# Patient Record
Sex: MALE | Race: WHITE | NOT HISPANIC OR LATINO | URBAN - METROPOLITAN AREA
[De-identification: names, ages, dates, MRNs, and addresses within clinical notes are randomized per-mention and may not be internally consistent; named-entity substitution may affect disease eponyms.]

---

## 2018-03-19 ENCOUNTER — INPATIENT (INPATIENT)
Facility: HOSPITAL | Age: 63
LOS: 2 days | Discharge: HOME HEALTH | End: 2018-03-22
Attending: HOSPITALIST | Admitting: HOSPITALIST
Payer: MEDICAID

## 2018-03-19 VITALS
SYSTOLIC BLOOD PRESSURE: 108 MMHG | RESPIRATION RATE: 18 BRPM | OXYGEN SATURATION: 100 % | DIASTOLIC BLOOD PRESSURE: 43 MMHG | HEART RATE: 87 BPM | TEMPERATURE: 97 F

## 2018-03-19 LAB
APPEARANCE UR: CLEAR — SIGNIFICANT CHANGE UP
APTT BLD: 30.7 SEC — SIGNIFICANT CHANGE UP (ref 27.5–37.4)
BASE EXCESS BLDV CALC-SCNC: 8 MMOL/L — SIGNIFICANT CHANGE UP
BASOPHILS # BLD AUTO: 0.02 K/UL — SIGNIFICANT CHANGE UP (ref 0–0.2)
BASOPHILS NFR BLD AUTO: 0.4 % — SIGNIFICANT CHANGE UP (ref 0–2)
BILIRUB UR-MCNC: NEGATIVE — SIGNIFICANT CHANGE UP
BLOOD GAS VENOUS - CREATININE: 1.12 MG/DL — SIGNIFICANT CHANGE UP (ref 0.5–1.3)
BLOOD UR QL VISUAL: NEGATIVE — SIGNIFICANT CHANGE UP
CHLORIDE BLDV-SCNC: 97 MMOL/L — SIGNIFICANT CHANGE UP (ref 96–108)
COLOR SPEC: YELLOW — SIGNIFICANT CHANGE UP
EOSINOPHIL # BLD AUTO: 0.11 K/UL — SIGNIFICANT CHANGE UP (ref 0–0.5)
EOSINOPHIL NFR BLD AUTO: 2.2 % — SIGNIFICANT CHANGE UP (ref 0–6)
GAS PNL BLDV: 129 MMOL/L — LOW (ref 136–146)
GLUCOSE BLDV-MCNC: 338 — HIGH (ref 70–99)
GLUCOSE UR-MCNC: >1000 — SIGNIFICANT CHANGE UP
HCO3 BLDV-SCNC: 30 MMOL/L — HIGH (ref 20–27)
HCT VFR BLD CALC: 40.4 % — SIGNIFICANT CHANGE UP (ref 39–50)
HCT VFR BLDV CALC: 39.5 % — SIGNIFICANT CHANGE UP (ref 39–51)
HGB BLD-MCNC: 12.8 G/DL — LOW (ref 13–17)
HGB BLDV-MCNC: 12.9 G/DL — LOW (ref 13–17)
IMM GRANULOCYTES # BLD AUTO: 0.03 # — SIGNIFICANT CHANGE UP
IMM GRANULOCYTES NFR BLD AUTO: 0.6 % — SIGNIFICANT CHANGE UP (ref 0–1.5)
INR BLD: 0.98 — SIGNIFICANT CHANGE UP (ref 0.88–1.17)
KETONES UR-MCNC: NEGATIVE — SIGNIFICANT CHANGE UP
LACTATE BLDV-MCNC: 1.4 MMOL/L — SIGNIFICANT CHANGE UP (ref 0.5–2)
LEUKOCYTE ESTERASE UR-ACNC: HIGH
LYMPHOCYTES # BLD AUTO: 1.16 K/UL — SIGNIFICANT CHANGE UP (ref 1–3.3)
LYMPHOCYTES # BLD AUTO: 23.2 % — SIGNIFICANT CHANGE UP (ref 13–44)
MCHC RBC-ENTMCNC: 28.5 PG — SIGNIFICANT CHANGE UP (ref 27–34)
MCHC RBC-ENTMCNC: 31.7 % — LOW (ref 32–36)
MCV RBC AUTO: 90 FL — SIGNIFICANT CHANGE UP (ref 80–100)
MONOCYTES # BLD AUTO: 0.99 K/UL — HIGH (ref 0–0.9)
MONOCYTES NFR BLD AUTO: 19.8 % — HIGH (ref 2–14)
NEUTROPHILS # BLD AUTO: 2.68 K/UL — SIGNIFICANT CHANGE UP (ref 1.8–7.4)
NEUTROPHILS NFR BLD AUTO: 53.8 % — SIGNIFICANT CHANGE UP (ref 43–77)
NITRITE UR-MCNC: NEGATIVE — SIGNIFICANT CHANGE UP
NRBC # FLD: 0 — SIGNIFICANT CHANGE UP
PCO2 BLDV: 61 MMHG — HIGH (ref 41–51)
PH BLDV: 7.36 PH — SIGNIFICANT CHANGE UP (ref 7.32–7.43)
PH UR: 6.5 — SIGNIFICANT CHANGE UP (ref 4.6–8)
PLATELET # BLD AUTO: 221 K/UL — SIGNIFICANT CHANGE UP (ref 150–400)
PMV BLD: 8.7 FL — SIGNIFICANT CHANGE UP (ref 7–13)
PO2 BLDV: 36 MMHG — SIGNIFICANT CHANGE UP (ref 35–40)
POTASSIUM BLDV-SCNC: 4.2 MMOL/L — SIGNIFICANT CHANGE UP (ref 3.4–4.5)
PROT UR-MCNC: 30 MG/DL — HIGH
PROTHROM AB SERPL-ACNC: 11.3 SEC — SIGNIFICANT CHANGE UP (ref 9.8–13.1)
RBC # BLD: 4.49 M/UL — SIGNIFICANT CHANGE UP (ref 4.2–5.8)
RBC # FLD: 14.6 % — HIGH (ref 10.3–14.5)
RBC CASTS # UR COMP ASSIST: SIGNIFICANT CHANGE UP (ref 0–?)
SAO2 % BLDV: 62.7 % — SIGNIFICANT CHANGE UP (ref 60–85)
SP GR SPEC: 1.02 — SIGNIFICANT CHANGE UP (ref 1–1.04)
SQUAMOUS # UR AUTO: SIGNIFICANT CHANGE UP
UROBILINOGEN FLD QL: NORMAL MG/DL — SIGNIFICANT CHANGE UP
WBC # BLD: 4.99 K/UL — SIGNIFICANT CHANGE UP (ref 3.8–10.5)
WBC # FLD AUTO: 4.99 K/UL — SIGNIFICANT CHANGE UP (ref 3.8–10.5)
WBC UR QL: HIGH (ref 0–?)

## 2018-03-19 NOTE — ED ADULT NURSE NOTE - OBJECTIVE STATEMENT
Pt received into room 20 C/O fevers, cough, weakness x 1week. Pt also C/O pain/swelling/redness to bilateral lower extremities. Pt A&Ox4, appears comfortable and in NAD; bilateral lower extremities appears red, swollen and are warm to touch.. Pt states he saw PCP 2 days ago for extremities: was diagnosed with an unknown lung infection and cellulitis to legs started on ABX. Awaiting Md evaluation, VS as noted. Pt has PMH COPD: on 4L NC continuously at home, AFIB, HTN, parkinson's, DM. 18 G IV placed to R AC, labs sent, cultures sent. Will continue to monitor for safety and comfort. Pt received into room 20 C/O fevers, cough, weakness x 1week. Pt also C/O pain/swelling/redness to bilateral lower extremities. Pt A&Ox4, appears comfortable and in NAD; bilateral lower extremities appears red, swollen and are warm to touch. Pt observed to have moisture associated dermatitis to lower abdomen, and non blanchable redness to sacral area. Pt states he saw PCP 2 days ago for extremities: was diagnosed with an unknown lung infection and cellulitis to legs started on ABX. Awaiting Md evaluation, VS as noted. Pt has PMH COPD: on 4L NC continuously at home, AFIB, HTN, parkinson's, DM. 18 G IV placed to R AC, labs sent, cultures sent. Will continue to monitor for safety and comfort.

## 2018-03-19 NOTE — ED ADULT NURSE NOTE - PMH
Asthma    Asthma with COPD    CAD (coronary artery disease)    COPD (chronic obstructive pulmonary disease)  on 4L NC  Depression    DM (diabetes mellitus), type 2    HTN (hypertension)    BRIAN on CPAP  on bipap now  PE (pulmonary embolism)  in 2001 (not on anticouagulants)

## 2018-03-19 NOTE — ED PROVIDER NOTE - ATTENDING CONTRIBUTION TO CARE
jhoana: 3 day hx of increasing redness to both legs. ran out of insulin 2 days ago. also has cough for several days; given tetracycline 2 days ago by PCP (allergy to pen and ampicillen)  exam: bilateral bright erythema to both legs; left leg has extension to thigh (left ).  impression: cellultiis  recc: check labs; antibiotics and fluids. cxr. jhoana: 3 day hx of increasing redness to both legs. ran out of insulin 2 days ago. also has cough for several days; given tetracycline 2 days ago by PCP (allergy to pen and ampicillen)  exam: bilateral bright erythema to both legs; left leg has extension to thigh (left ).  impression: cellultiis.  recc: check labs; antibiotics and fluids. cxr.

## 2018-03-19 NOTE — ED ADULT NURSE NOTE - ED STAT RN HANDOFF DETAILS
Patient is in A&Ox4, aware of plan of care and in NAD, and has room available.  Report given to nurse on floor via phone.  Patient awaiting transportation.  Will continue to monitor patient closely. HAVEN Lorenz R.N.

## 2018-03-19 NOTE — ED PROVIDER NOTE - OBJECTIVE STATEMENT
62M pmh dm, afib on AC, BRIAN p/w lower extremity cellulitis x1 week. rash appears bilateral with bullae, worse on right but with extension of 62M pmh dm, afib on AC, BRIAN p/w lower extremity cellulitis x1 week. rash appears bilateral with bullae, worse on right but with extension of erythema on the left upwards towards the thigh. pt also complains of dry cough x1 week which pt received tetracycline for. Cough gradual in onset associated with chills and fever. endorses some sob, denies n/v/d/cp.

## 2018-03-19 NOTE — ED ADULT TRIAGE NOTE - CHIEF COMPLAINT QUOTE
pt BIBA from home, pt c/o "I don't fel good x 3 days".  pt reports weakness.  denies chest pain and SOB.  pt says he felt better once he got outside

## 2018-03-20 DIAGNOSIS — I48.0 PAROXYSMAL ATRIAL FIBRILLATION: ICD-10-CM

## 2018-03-20 DIAGNOSIS — R21 RASH AND OTHER NONSPECIFIC SKIN ERUPTION: ICD-10-CM

## 2018-03-20 DIAGNOSIS — J44.9 CHRONIC OBSTRUCTIVE PULMONARY DISEASE, UNSPECIFIED: ICD-10-CM

## 2018-03-20 DIAGNOSIS — E11.621 TYPE 2 DIABETES MELLITUS WITH FOOT ULCER: ICD-10-CM

## 2018-03-20 DIAGNOSIS — L03.119 CELLULITIS OF UNSPECIFIED PART OF LIMB: ICD-10-CM

## 2018-03-20 DIAGNOSIS — F32.9 MAJOR DEPRESSIVE DISORDER, SINGLE EPISODE, UNSPECIFIED: ICD-10-CM

## 2018-03-20 DIAGNOSIS — G20 PARKINSON'S DISEASE: ICD-10-CM

## 2018-03-20 DIAGNOSIS — Z29.9 ENCOUNTER FOR PROPHYLACTIC MEASURES, UNSPECIFIED: ICD-10-CM

## 2018-03-20 DIAGNOSIS — G47.33 OBSTRUCTIVE SLEEP APNEA (ADULT) (PEDIATRIC): ICD-10-CM

## 2018-03-20 DIAGNOSIS — E66.01 MORBID (SEVERE) OBESITY DUE TO EXCESS CALORIES: ICD-10-CM

## 2018-03-20 DIAGNOSIS — L03.90 CELLULITIS, UNSPECIFIED: ICD-10-CM

## 2018-03-20 LAB
ALBUMIN SERPL ELPH-MCNC: 3.4 G/DL — SIGNIFICANT CHANGE UP (ref 3.3–5)
ALP SERPL-CCNC: 58 U/L — SIGNIFICANT CHANGE UP (ref 40–120)
ALT FLD-CCNC: 18 U/L — SIGNIFICANT CHANGE UP (ref 4–41)
ANISOCYTOSIS BLD QL: SLIGHT — SIGNIFICANT CHANGE UP
AST SERPL-CCNC: 27 U/L — SIGNIFICANT CHANGE UP (ref 4–40)
BASOPHILS NFR SPEC: 0.9 % — SIGNIFICANT CHANGE UP (ref 0–2)
BILIRUB SERPL-MCNC: 0.6 MG/DL — SIGNIFICANT CHANGE UP (ref 0.2–1.2)
BUN SERPL-MCNC: 13 MG/DL — SIGNIFICANT CHANGE UP (ref 7–23)
CALCIUM SERPL-MCNC: 8.3 MG/DL — LOW (ref 8.4–10.5)
CHLORIDE SERPL-SCNC: 96 MMOL/L — LOW (ref 98–107)
CK MB BLD-MCNC: 2.26 NG/ML — SIGNIFICANT CHANGE UP (ref 1–6.6)
CO2 SERPL-SCNC: 31 MMOL/L — SIGNIFICANT CHANGE UP (ref 22–31)
CREAT SERPL-MCNC: 1.22 MG/DL — SIGNIFICANT CHANGE UP (ref 0.5–1.3)
EOSINOPHIL NFR FLD: 2.7 % — SIGNIFICANT CHANGE UP (ref 0–6)
GIANT PLATELETS BLD QL SMEAR: PRESENT — SIGNIFICANT CHANGE UP
GLUCOSE SERPL-MCNC: 338 MG/DL — HIGH (ref 70–99)
LYMPHOCYTES NFR SPEC AUTO: 10.1 % — LOW (ref 13–44)
MONOCYTES NFR BLD: 9.2 % — HIGH (ref 2–9)
NEUTROPHIL AB SER-ACNC: 71.6 % — SIGNIFICANT CHANGE UP (ref 43–77)
NEUTS BAND # BLD: 1.8 % — SIGNIFICANT CHANGE UP (ref 0–6)
PLATELET COUNT - ESTIMATE: NORMAL — SIGNIFICANT CHANGE UP
POTASSIUM SERPL-MCNC: 4.7 MMOL/L — SIGNIFICANT CHANGE UP (ref 3.5–5.3)
POTASSIUM SERPL-SCNC: 4.7 MMOL/L — SIGNIFICANT CHANGE UP (ref 3.5–5.3)
PROT SERPL-MCNC: 6.7 G/DL — SIGNIFICANT CHANGE UP (ref 6–8.3)
SMUDGE CELLS # BLD: PRESENT — SIGNIFICANT CHANGE UP
SODIUM SERPL-SCNC: 137 MMOL/L — SIGNIFICANT CHANGE UP (ref 135–145)
SPECIMEN SOURCE: SIGNIFICANT CHANGE UP
SPECIMEN SOURCE: SIGNIFICANT CHANGE UP
TROPONIN T SERPL-MCNC: < 0.06 NG/ML — SIGNIFICANT CHANGE UP (ref 0–0.06)
VARIANT LYMPHS # BLD: 3.7 % — SIGNIFICANT CHANGE UP

## 2018-03-20 PROCEDURE — 99223 1ST HOSP IP/OBS HIGH 75: CPT | Mod: GC

## 2018-03-20 RX ORDER — LIDOCAINE 4 G/100G
1 CREAM TOPICAL DAILY
Qty: 0 | Refills: 0 | Status: DISCONTINUED | OUTPATIENT
Start: 2018-03-20 | End: 2018-03-22

## 2018-03-20 RX ORDER — INSULIN LISPRO 100/ML
10 VIAL (ML) SUBCUTANEOUS
Qty: 0 | Refills: 0 | Status: DISCONTINUED | OUTPATIENT
Start: 2018-03-20 | End: 2018-03-20

## 2018-03-20 RX ORDER — GLUCAGON INJECTION, SOLUTION 0.5 MG/.1ML
1 INJECTION, SOLUTION SUBCUTANEOUS ONCE
Qty: 0 | Refills: 0 | Status: DISCONTINUED | OUTPATIENT
Start: 2018-03-20 | End: 2018-03-20

## 2018-03-20 RX ORDER — GABAPENTIN 400 MG/1
600 CAPSULE ORAL
Qty: 0 | Refills: 0 | Status: DISCONTINUED | OUTPATIENT
Start: 2018-03-20 | End: 2018-03-20

## 2018-03-20 RX ORDER — METOPROLOL TARTRATE 50 MG
1 TABLET ORAL
Qty: 0 | Refills: 0 | COMMUNITY

## 2018-03-20 RX ORDER — CARBIDOPA AND LEVODOPA 25; 100 MG/1; MG/1
1 TABLET ORAL
Qty: 0 | Refills: 0 | COMMUNITY

## 2018-03-20 RX ORDER — GABAPENTIN 400 MG/1
600 CAPSULE ORAL THREE TIMES A DAY
Qty: 0 | Refills: 0 | Status: DISCONTINUED | OUTPATIENT
Start: 2018-03-20 | End: 2018-03-21

## 2018-03-20 RX ORDER — DEXTROSE 50 % IN WATER 50 %
25 SYRINGE (ML) INTRAVENOUS ONCE
Qty: 0 | Refills: 0 | Status: DISCONTINUED | OUTPATIENT
Start: 2018-03-20 | End: 2018-03-22

## 2018-03-20 RX ORDER — DEXTROSE 50 % IN WATER 50 %
25 SYRINGE (ML) INTRAVENOUS ONCE
Qty: 0 | Refills: 0 | Status: DISCONTINUED | OUTPATIENT
Start: 2018-03-20 | End: 2018-03-20

## 2018-03-20 RX ORDER — KETOROLAC TROMETHAMINE 30 MG/ML
15 SYRINGE (ML) INJECTION ONCE
Qty: 0 | Refills: 0 | Status: DISCONTINUED | OUTPATIENT
Start: 2018-03-20 | End: 2018-03-20

## 2018-03-20 RX ORDER — INSULIN LISPRO 100/ML
VIAL (ML) SUBCUTANEOUS AT BEDTIME
Qty: 0 | Refills: 0 | Status: DISCONTINUED | OUTPATIENT
Start: 2018-03-20 | End: 2018-03-22

## 2018-03-20 RX ORDER — INSULIN LISPRO 100/ML
VIAL (ML) SUBCUTANEOUS
Qty: 0 | Refills: 0 | Status: DISCONTINUED | OUTPATIENT
Start: 2018-03-20 | End: 2018-03-20

## 2018-03-20 RX ORDER — GABAPENTIN 400 MG/1
1 CAPSULE ORAL
Qty: 0 | Refills: 0 | COMMUNITY

## 2018-03-20 RX ORDER — DIVALPROEX SODIUM 500 MG/1
1 TABLET, DELAYED RELEASE ORAL
Qty: 0 | Refills: 0 | COMMUNITY

## 2018-03-20 RX ORDER — INSULIN LISPRO 100/ML
VIAL (ML) SUBCUTANEOUS AT BEDTIME
Qty: 0 | Refills: 0 | Status: DISCONTINUED | OUTPATIENT
Start: 2018-03-20 | End: 2018-03-20

## 2018-03-20 RX ORDER — RIVAROXABAN 15 MG-20MG
20 KIT ORAL EVERY 24 HOURS
Qty: 0 | Refills: 0 | Status: DISCONTINUED | OUTPATIENT
Start: 2018-03-20 | End: 2018-03-22

## 2018-03-20 RX ORDER — INSULIN LISPRO 100/ML
VIAL (ML) SUBCUTANEOUS
Qty: 0 | Refills: 0 | Status: DISCONTINUED | OUTPATIENT
Start: 2018-03-20 | End: 2018-03-22

## 2018-03-20 RX ORDER — FOLIC ACID 0.8 MG
1 TABLET ORAL
Qty: 0 | Refills: 0 | COMMUNITY

## 2018-03-20 RX ORDER — CARBIDOPA AND LEVODOPA 25; 100 MG/1; MG/1
1 TABLET ORAL THREE TIMES A DAY
Qty: 0 | Refills: 0 | Status: DISCONTINUED | OUTPATIENT
Start: 2018-03-20 | End: 2018-03-22

## 2018-03-20 RX ORDER — TIOTROPIUM BROMIDE 18 UG/1
1 CAPSULE ORAL; RESPIRATORY (INHALATION) DAILY
Qty: 0 | Refills: 0 | Status: DISCONTINUED | OUTPATIENT
Start: 2018-03-20 | End: 2018-03-22

## 2018-03-20 RX ORDER — DEXTROSE 50 % IN WATER 50 %
1 SYRINGE (ML) INTRAVENOUS ONCE
Qty: 0 | Refills: 0 | Status: DISCONTINUED | OUTPATIENT
Start: 2018-03-20 | End: 2018-03-22

## 2018-03-20 RX ORDER — CLONAZEPAM 1 MG
0.5 TABLET ORAL
Qty: 0 | Refills: 0 | Status: DISCONTINUED | OUTPATIENT
Start: 2018-03-20 | End: 2018-03-21

## 2018-03-20 RX ORDER — SODIUM CHLORIDE 9 MG/ML
1000 INJECTION, SOLUTION INTRAVENOUS
Qty: 0 | Refills: 0 | Status: DISCONTINUED | OUTPATIENT
Start: 2018-03-20 | End: 2018-03-22

## 2018-03-20 RX ORDER — RANITIDINE HYDROCHLORIDE 150 MG/1
1 TABLET, FILM COATED ORAL
Qty: 0 | Refills: 0 | COMMUNITY

## 2018-03-20 RX ORDER — METOPROLOL TARTRATE 50 MG
25 TABLET ORAL
Qty: 0 | Refills: 0 | Status: DISCONTINUED | OUTPATIENT
Start: 2018-03-20 | End: 2018-03-22

## 2018-03-20 RX ORDER — DIVALPROEX SODIUM 500 MG/1
500 TABLET, DELAYED RELEASE ORAL AT BEDTIME
Qty: 0 | Refills: 0 | Status: DISCONTINUED | OUTPATIENT
Start: 2018-03-20 | End: 2018-03-22

## 2018-03-20 RX ORDER — INSULIN GLARGINE 100 [IU]/ML
45 INJECTION, SOLUTION SUBCUTANEOUS ONCE
Qty: 0 | Refills: 0 | Status: COMPLETED | OUTPATIENT
Start: 2018-03-20 | End: 2018-03-20

## 2018-03-20 RX ORDER — NYSTATIN CREAM 100000 [USP'U]/G
1 CREAM TOPICAL
Qty: 0 | Refills: 0 | Status: DISCONTINUED | OUTPATIENT
Start: 2018-03-20 | End: 2018-03-22

## 2018-03-20 RX ORDER — INSULIN LISPRO 100/ML
10 VIAL (ML) SUBCUTANEOUS
Qty: 0 | Refills: 0 | Status: DISCONTINUED | OUTPATIENT
Start: 2018-03-20 | End: 2018-03-22

## 2018-03-20 RX ORDER — ACETAMINOPHEN 500 MG
650 TABLET ORAL EVERY 6 HOURS
Qty: 0 | Refills: 0 | Status: DISCONTINUED | OUTPATIENT
Start: 2018-03-20 | End: 2018-03-22

## 2018-03-20 RX ORDER — FOLIC ACID 0.8 MG
0.8 TABLET ORAL DAILY
Qty: 0 | Refills: 0 | Status: DISCONTINUED | OUTPATIENT
Start: 2018-03-20 | End: 2018-03-22

## 2018-03-20 RX ORDER — DEXTROSE 50 % IN WATER 50 %
12.5 SYRINGE (ML) INTRAVENOUS ONCE
Qty: 0 | Refills: 0 | Status: DISCONTINUED | OUTPATIENT
Start: 2018-03-20 | End: 2018-03-22

## 2018-03-20 RX ORDER — DEXTROSE 50 % IN WATER 50 %
1 SYRINGE (ML) INTRAVENOUS ONCE
Qty: 0 | Refills: 0 | Status: DISCONTINUED | OUTPATIENT
Start: 2018-03-20 | End: 2018-03-20

## 2018-03-20 RX ORDER — INSULIN GLARGINE 100 [IU]/ML
45 INJECTION, SOLUTION SUBCUTANEOUS AT BEDTIME
Qty: 0 | Refills: 0 | Status: DISCONTINUED | OUTPATIENT
Start: 2018-03-20 | End: 2018-03-21

## 2018-03-20 RX ORDER — FAMOTIDINE 10 MG/ML
20 INJECTION INTRAVENOUS AT BEDTIME
Qty: 0 | Refills: 0 | Status: DISCONTINUED | OUTPATIENT
Start: 2018-03-20 | End: 2018-03-22

## 2018-03-20 RX ORDER — LIPASE/PROTEASE/AMYLASE 16-48-48K
1 CAPSULE,DELAYED RELEASE (ENTERIC COATED) ORAL THREE TIMES A DAY
Qty: 0 | Refills: 0 | Status: DISCONTINUED | OUTPATIENT
Start: 2018-03-20 | End: 2018-03-20

## 2018-03-20 RX ORDER — TRAMADOL HYDROCHLORIDE 50 MG/1
25 TABLET ORAL EVERY 6 HOURS
Qty: 0 | Refills: 0 | Status: DISCONTINUED | OUTPATIENT
Start: 2018-03-20 | End: 2018-03-22

## 2018-03-20 RX ORDER — BUDESONIDE AND FORMOTEROL FUMARATE DIHYDRATE 160; 4.5 UG/1; UG/1
2 AEROSOL RESPIRATORY (INHALATION)
Qty: 0 | Refills: 0 | Status: DISCONTINUED | OUTPATIENT
Start: 2018-03-20 | End: 2018-03-22

## 2018-03-20 RX ORDER — DEXTROSE 50 % IN WATER 50 %
12.5 SYRINGE (ML) INTRAVENOUS ONCE
Qty: 0 | Refills: 0 | Status: DISCONTINUED | OUTPATIENT
Start: 2018-03-20 | End: 2018-03-20

## 2018-03-20 RX ORDER — INSULIN GLARGINE 100 [IU]/ML
45 INJECTION, SOLUTION SUBCUTANEOUS AT BEDTIME
Qty: 0 | Refills: 0 | Status: DISCONTINUED | OUTPATIENT
Start: 2018-03-20 | End: 2018-03-20

## 2018-03-20 RX ORDER — TIOTROPIUM BROMIDE 18 UG/1
2 CAPSULE ORAL; RESPIRATORY (INHALATION)
Qty: 0 | Refills: 0 | COMMUNITY

## 2018-03-20 RX ORDER — GLUCAGON INJECTION, SOLUTION 0.5 MG/.1ML
1 INJECTION, SOLUTION SUBCUTANEOUS ONCE
Qty: 0 | Refills: 0 | Status: DISCONTINUED | OUTPATIENT
Start: 2018-03-20 | End: 2018-03-22

## 2018-03-20 RX ORDER — LIPASE/PROTEASE/AMYLASE 16-48-48K
1 CAPSULE,DELAYED RELEASE (ENTERIC COATED) ORAL
Qty: 0 | Refills: 0 | COMMUNITY

## 2018-03-20 RX ORDER — SODIUM CHLORIDE 9 MG/ML
1000 INJECTION, SOLUTION INTRAVENOUS
Qty: 0 | Refills: 0 | Status: DISCONTINUED | OUTPATIENT
Start: 2018-03-20 | End: 2018-03-20

## 2018-03-20 RX ORDER — ENOXAPARIN SODIUM 100 MG/ML
40 INJECTION SUBCUTANEOUS DAILY
Qty: 0 | Refills: 0 | Status: DISCONTINUED | OUTPATIENT
Start: 2018-03-20 | End: 2018-03-20

## 2018-03-20 RX ORDER — RIVAROXABAN 15 MG-20MG
1 KIT ORAL
Qty: 0 | Refills: 0 | COMMUNITY

## 2018-03-20 RX ORDER — METOPROLOL TARTRATE 50 MG
25 TABLET ORAL
Qty: 0 | Refills: 0 | Status: DISCONTINUED | OUTPATIENT
Start: 2018-03-20 | End: 2018-03-20

## 2018-03-20 RX ADMIN — FAMOTIDINE 20 MILLIGRAM(S): 10 INJECTION INTRAVENOUS at 21:23

## 2018-03-20 RX ADMIN — DIVALPROEX SODIUM 500 MILLIGRAM(S): 500 TABLET, DELAYED RELEASE ORAL at 22:12

## 2018-03-20 RX ADMIN — TIOTROPIUM BROMIDE 1 CAPSULE(S): 18 CAPSULE ORAL; RESPIRATORY (INHALATION) at 11:24

## 2018-03-20 RX ADMIN — BUDESONIDE AND FORMOTEROL FUMARATE DIHYDRATE 2 PUFF(S): 160; 4.5 AEROSOL RESPIRATORY (INHALATION) at 21:23

## 2018-03-20 RX ADMIN — Medication 25 MILLIGRAM(S): at 17:43

## 2018-03-20 RX ADMIN — Medication 4: at 22:11

## 2018-03-20 RX ADMIN — Medication 100 MILLIGRAM(S): at 21:23

## 2018-03-20 RX ADMIN — RIVAROXABAN 20 MILLIGRAM(S): KIT at 17:44

## 2018-03-20 RX ADMIN — GABAPENTIN 600 MILLIGRAM(S): 400 CAPSULE ORAL at 13:53

## 2018-03-20 RX ADMIN — Medication 15 MILLIGRAM(S): at 17:44

## 2018-03-20 RX ADMIN — Medication 100 MILLIGRAM(S): at 00:09

## 2018-03-20 RX ADMIN — Medication 15 MILLIGRAM(S): at 02:45

## 2018-03-20 RX ADMIN — CARBIDOPA AND LEVODOPA 1 TABLET(S): 25; 100 TABLET ORAL at 13:53

## 2018-03-20 RX ADMIN — Medication 0.5 MILLIGRAM(S): at 21:23

## 2018-03-20 RX ADMIN — Medication 10 UNIT(S): at 13:55

## 2018-03-20 RX ADMIN — Medication 650 MILLIGRAM(S): at 11:48

## 2018-03-20 RX ADMIN — LIDOCAINE 1 PATCH: 4 CREAM TOPICAL at 13:56

## 2018-03-20 RX ADMIN — NYSTATIN CREAM 1 APPLICATION(S): 100000 CREAM TOPICAL at 17:43

## 2018-03-20 RX ADMIN — Medication 0.8 MILLIGRAM(S): at 13:53

## 2018-03-20 RX ADMIN — CARBIDOPA AND LEVODOPA 1 TABLET(S): 25; 100 TABLET ORAL at 21:23

## 2018-03-20 RX ADMIN — Medication 10 UNIT(S): at 17:58

## 2018-03-20 RX ADMIN — INSULIN GLARGINE 45 UNIT(S): 100 INJECTION, SOLUTION SUBCUTANEOUS at 02:45

## 2018-03-20 RX ADMIN — Medication 6: at 09:18

## 2018-03-20 RX ADMIN — Medication 650 MILLIGRAM(S): at 12:48

## 2018-03-20 RX ADMIN — Medication 2: at 13:54

## 2018-03-20 RX ADMIN — Medication 100 MILLIGRAM(S): at 13:56

## 2018-03-20 RX ADMIN — Medication 10 UNIT(S): at 09:18

## 2018-03-20 RX ADMIN — GABAPENTIN 600 MILLIGRAM(S): 400 CAPSULE ORAL at 21:23

## 2018-03-20 RX ADMIN — Medication 6: at 17:58

## 2018-03-20 RX ADMIN — INSULIN GLARGINE 45 UNIT(S): 100 INJECTION, SOLUTION SUBCUTANEOUS at 22:11

## 2018-03-20 NOTE — H&P ADULT - PROBLEM SELECTOR PLAN 8
Patient with history of anxiety and depression on buspiron and clonazepam.  -unclear use of divalproex, PCP unclear as Sunday was first visit  -will clarify with psychiatrist  -continue buspirone Patient with history of anxiety and depression on buspiron and clonazepam.  -unclear use of divalproex, PCP unclear as Sunday was first visit  -will clarify with psychiatrist  -continue buspirone  -will continue clonazepam ISTOP # [id number]

## 2018-03-20 NOTE — H&P ADULT - PROBLEM SELECTOR PLAN 4
Patient with elevated BG on high levels of insulin at home with likely peripheral neuropathy.  -continue gabapentin  -wound care of foot  -glargine 45U lispro 10U TID + SSI Patient with elevated BG on high levels of insulin at home with likely peripheral neuropathy.  -continue gabapentin, will increase to 600 TID given continued pain  -wound care of foot  -glargine 45U lispro 10U TID + SSI will adjust as needed

## 2018-03-20 NOTE — H&P ADULT - PROBLEM SELECTOR PLAN 2
Patient with abdominal rash with no signs of mucosal spread. Groin rash likely from dermatitis.   -nystatin powder  -will monitor for worsening symptoms

## 2018-03-20 NOTE — H&P ADULT - PROBLEM SELECTOR PLAN 3
Patient presenting with cough. No change in sputum production or worsening SOB. On 4 L NC at home. No leukocytosis. CXR no opacifications. Patient may have cardiac complications of BRIAN+COPD possibly pulm htn, however no LE edema.  -Continue on O2 at home as needed  -will collect TTE from OP provider, if none available with perform TTE given cardiomegaly and signs of venous stasis  -continue home inhaler tiotropium and budesonide-formoterol

## 2018-03-20 NOTE — H&P ADULT - NSHPLABSRESULTS_GEN_ALL_CORE
LABS:                        12.8   4.99  )-----------( 221      ( 19 Mar 2018 23:00 )             40.4         137  |  96<L>  |  13  ----------------------------<  338<H>  4.7   |  31  |  1.22    Ca    8.3<L>      19 Mar 2018 23:00    TPro  6.7  /  Alb  3.4  /  TBili  0.6  /  DBili  x   /  AST  27  /  ALT  18  /  AlkPhos  58      PT/INR - ( 19 Mar 2018 23:00 )   PT: 11.3 SEC;   INR: 0.98          PTT - ( 19 Mar 2018 23:00 )  PTT:30.7 SEC  CARDIAC MARKERS ( 19 Mar 2018 23:00 )  x     / < 0.06 ng/mL / x     / 2.26 ng/mL / x          Urinalysis Basic - ( 19 Mar 2018 23:00 )    Color: YELLOW / Appearance: CLEAR / S.023 / pH: 6.5  Gluc: >1000 / Ketone: NEGATIVE  / Bili: NEGATIVE / Urobili: NORMAL mg/dL   Blood: NEGATIVE / Protein: 30 mg/dL / Nitrite: NEGATIVE   Leuk Esterase: SMALL / RBC: 2-5 / WBC 5-10   Sq Epi: OCC / Non Sq Epi: x / Bacteria: x      EKG: vent rate 87 with afib Q waves in III, poor QRS progression, LVH    CXR: Lung clear w/ enlarged medistimum

## 2018-03-20 NOTE — H&P ADULT - PROBLEM SELECTOR PLAN 1
Patient without leukocytosis with mild fever in ED, and pain. Patient with signs of venous stasis and a diabetic foot ulcer. Unlikely DVT given bilateral erythema and pt on rivaroxaban. Was previously on doxycycline without improvement. Given 1 dose of clindamycin in ED.   -f/u blood cultures  -will continue clindamycin 900 mg q8

## 2018-03-20 NOTE — H&P ADULT - HISTORY OF PRESENT ILLNESS
62M pmh dm, afib on AC, BRIAN p/w lower extremity cellulitis x1 week. rash appears bilateral with bullae, worse on right but with extension of erythema on the left upwards towards the thigh. pt also complains of dry cough x1 week which pt received tetracycline for.  Vitals: 97.4; 87; 108/43; 100% on RA  Received 900mg IV clindamycin; ketorolac 15mg. 62M PMHx TIIDM, Afib on rivaroxaban s/p ablation, morbid obesity, Parkinson's disease, COPD on 4LNC, asthma, BRIAN on Bilevel uses inconsistently, HTN, TIIDM, presenting with cough via EMS and subsequently found to have possible cellulitis. Patient states that he went to his PCP for cough on 3/18 and was prescribed doxycycline for the cough. When asked if it was for his legs he said he didn't know. He states that he noticed the redness on his legs 1 week ago and there has been associated LE pain, without edema, fever ,chills. He has had cellulitis before and has a "diabetic" ulcer on his right foot, but denies any other trauma to the legs. He states that he does not have any "heart conditions". He has been taking his medications as prescribed.   When asked about the rash on his stomach he states he noticed it about 2-3 days ago. He  has not tried anything for it.   He states that he has a productive cough of white sputum and felt short of breath during coughing episodes. He takes his inhalers as prescribed. He has not had any sick contacts. He is on 4L at home for COPD.   Vitals: 97.4-100.4; 87; 108/43; 100% on RA  Received 900mg IV clindamycin; ketorolac 15mg.

## 2018-03-20 NOTE — H&P ADULT - ASSESSMENT
62M PMHx TIIDM, Afib on rivaroxaban s/p ablation, morbid obesity, Parkinson's disease, COPD on 4LNC, asthma, BRIAN on Bilevel uses inconsistently, HTN, DM,n presenting with cough via EMS and subsequently found to have possible cellulitis. Patient without leukocytosis with mild fever in ED, and pain. Patient with signs of venous stasis and a diabetic foot ulcer. Unlikely DVT given bilateral erythema and pt on rivaroxaban. Story not clear from patient will require further information from family and PCP.

## 2018-03-20 NOTE — H&P ADULT - PROBLEM SELECTOR PLAN 7
Patient states he does not use CPAP at home due to discomfort. Does not known settings.   -will clarify with OP and wife about settings  -will start if patient agreeable

## 2018-03-20 NOTE — H&P ADULT - ATTENDING COMMENTS
Patient seen and examined. Agree with above note by resident.    # non purulent cellulitis - C/w clinda for now. Demarcate the area. Follow up blood cultures.   # radiculopathy - PT eval. Supportive measures with lidoderm patch, tramadol and gabapentin.   #COPD - not in exacerbation. C/w spiriva, symbicort, o2 as needed   #paroxysmal afib - c/w xarelto   #parkinsons disease -  c/w sinemet    Plan discussed with patient

## 2018-03-20 NOTE — H&P ADULT - NSHPREVIEWOFSYSTEMS_GEN_ALL_CORE
CONSTITUTIONAL: No weakness, fevers or chills  EYES/ENT: No visual changes;  No vertigo or throat pain   NECK: No pain or stiffness  RESPIRATORY: No cough, wheezing, hemoptysis; No shortness of breath  CARDIOVASCULAR: No chest pain or palpitations  GASTROINTESTINAL: No abdominal or epigastric pain. No nausea, vomiting, or hematemesis; No diarrhea or constipation. No melena or hematochezia.  GENITOURINARY: No dysuria, frequency or hematuria  NEUROLOGICAL: No numbness or weakness  SKIN: No itching, rashes CONSTITUTIONAL: No weakness, fevers or chills  EYES/ENT: No visual changes;  No vertigo or throat pain   NECK: No pain or stiffness  RESPIRATORY: See HPI  CARDIOVASCULAR: No chest pain or palpitations  GASTROINTESTINAL:  No nausea, vomiting, or hematemesis; No diarrhea or constipation. No melena or hematochezia.  GENITOURINARY: No dysuria, frequency or hematuria  NEUROLOGICAL: Numbness of feet  SKIN: +itching/rash

## 2018-03-20 NOTE — H&P ADULT - NSHPSOCIALHISTORY_GEN_ALL_CORE
Patient lives at home with wife with nursing aid. He does not smoke, never did. Does not drink alcohol. Patient lives at home with wife with nursing aid. He does not smoke, never did. Does not drink alcohol. No other drugs

## 2018-03-20 NOTE — H&P ADULT - NSHPPHYSICALEXAM_GEN_ALL_CORE
Vital Signs Last 24 Hrs  T(C): 37.2 (20 Mar 2018 07:57), Max: 38 (19 Mar 2018 23:13)  T(F): 99 (20 Mar 2018 07:57), Max: 100.4 (19 Mar 2018 23:13)  HR: 74 (20 Mar 2018 07:57) (73 - 95)  BP: 121/68 (20 Mar 2018 07:57) (108/43 - 143/54)  BP(mean): --  RR: 20 (20 Mar 2018 07:57) (18 - 20)  SpO2: 97% (20 Mar 2018 07:57) (97% - 100%)  CAPILLARY BLOOD GLUCOSE      POCT Blood Glucose.: 268 mg/dL (20 Mar 2018 08:51)  POCT Blood Glucose.: 317 mg/dL (19 Mar 2018 21:34)    I&O's Summary    19 Mar 2018 07:01  -  20 Mar 2018 07:00  --------------------------------------------------------  IN: 0 mL / OUT: 400 mL / NET: -400 mL        PHYSICAL EXAM:  GENERAL: NAD, morbidly obese  HEAD:  poor dentition, Atraumatic, Normocephalic  EYES: EOMI, PERRLA, conjunctiva and sclera clear  NECK: Supple, No JVD, large neck circumference  CHEST/LUNG: Mild expiratory wheezes; 90% on RA; 18bpm; no supra/intrathoracic retractions.  HEART: Distant heart sounds. irregularly irregular; regular rhythm; No murmurs, rubs, or gallops  ABDOMEN: Obese abdomen; Soft, Nontender, Nondistended; Bowel sounds present  EXTREMITIES: Diffuse erythema of legs bilaterally with TTP; no hair on legs; ulceration on right lateral foot without purulent drainage; decreased Peripheral Pulses, No clubbing, cyanosis, crepitus or edema; mild cogwheel rigidity on right arm  PSYCH: AAOx3  NEUROLOGY: non-focal  SKIN: erythema of abdomen without TTP with skin breakdown Vital Signs Last 24 Hrs  T(C): 37.2 (20 Mar 2018 07:57), Max: 38 (19 Mar 2018 23:13)  T(F): 99 (20 Mar 2018 07:57), Max: 100.4 (19 Mar 2018 23:13)  HR: 74 (20 Mar 2018 07:57) (73 - 95)  BP: 121/68 (20 Mar 2018 07:57) (108/43 - 143/54)  BP(mean): --  RR: 20 (20 Mar 2018 07:57) (18 - 20)  SpO2: 97% (20 Mar 2018 07:57) (97% - 100%)  CAPILLARY BLOOD GLUCOSE      POCT Blood Glucose.: 268 mg/dL (20 Mar 2018 08:51)  POCT Blood Glucose.: 317 mg/dL (19 Mar 2018 21:34)    I&O's Summary    19 Mar 2018 07:01  -  20 Mar 2018 07:00  --------------------------------------------------------  IN: 0 mL / OUT: 400 mL / NET: -400 mL        PHYSICAL EXAM:  GENERAL: NAD, morbidly obese  HEAD:  poor dentition, Atraumatic, Normocephalic  EYES: EOMI, PERRLA, conjunctiva and sclera clear  NECK: Supple, No JVD, large neck circumference  CHEST/LUNG: Mild expiratory wheezes; 90% on RA; 18bpm; no supra/intrathoracic retractions.  HEART: Distant heart sounds. irregularly irregular; regular rhythm; No murmurs, rubs, or gallops  ABDOMEN: Obese abdomen; Soft, Nontender, Nondistended; Bowel sounds present  EXTREMITIES: Diffuse erythema of legs bilaterally with TTP; no hair on legs; ulceration on right lateral foot without purulent drainage; decreased Peripheral Pulses; decreased sensation of feet bilaterally No clubbing, cyanosis, crepitus or edema; mild cogwheel rigidity on right arm  PSYCH: AAOx3  NEUROLOGY: non-focal  SKIN: erythema of abdomen without TTP with skin breakdown

## 2018-03-20 NOTE — H&P ADULT - PMH
Asthma    Asthma with COPD    CAD (coronary artery disease)    COPD (chronic obstructive pulmonary disease)  on 4L NC  Depression    DM (diabetes mellitus), type 2    HTN (hypertension)    BRIAN on CPAP  on bipap now  Parkinson disease    PE (pulmonary embolism)  in 2001 (not on anticouagulants) Asthma    Asthma with COPD    CAD (coronary artery disease)    COPD (chronic obstructive pulmonary disease)  on 4L NC  Depression    DM (diabetes mellitus), type 2    HTN (hypertension)    BRIAN on CPAP  on bipap now  Parkinson disease    Paroxysmal atrial fibrillation    PE (pulmonary embolism)  in 2001 (not on anticouagulants)

## 2018-03-20 NOTE — PHYSICAL THERAPY INITIAL EVALUATION ADULT - ADDITIONAL COMMENTS
Pt. had HHA services, which were recently stopped.  Home O2 used 24/7.    Pt. left in bed post-PT in NAD with all lines/tubes intact & call bell within reach.  RN Donato mehta.

## 2018-03-20 NOTE — PHYSICAL THERAPY INITIAL EVALUATION ADULT - CRITERIA FOR SKILLED THERAPEUTIC INTERVENTIONS
rehab potential/therapy frequency/impairments found/predicted duration of therapy intervention/anticipated discharge recommendation/Inpatient restorative rehab

## 2018-03-20 NOTE — PHYSICAL THERAPY INITIAL EVALUATION ADULT - PERTINENT HX OF CURRENT PROBLEM, REHAB EVAL
Pt. is a 63 y/o male admitted to [facility] on 03/20/18 with a dx of cellulitis of legs and cough.  PT consult request in order to assess functional status.  H/O morbid obesity --> currently 308.6 pounds.

## 2018-03-21 ENCOUNTER — TRANSCRIPTION ENCOUNTER (OUTPATIENT)
Age: 63
End: 2018-03-21

## 2018-03-21 LAB
BACTERIA UR CULT: SIGNIFICANT CHANGE UP
BUN SERPL-MCNC: 15 MG/DL — SIGNIFICANT CHANGE UP (ref 7–23)
CALCIUM SERPL-MCNC: 7.9 MG/DL — LOW (ref 8.4–10.5)
CHLORIDE SERPL-SCNC: 96 MMOL/L — LOW (ref 98–107)
CO2 SERPL-SCNC: 30 MMOL/L — SIGNIFICANT CHANGE UP (ref 22–31)
CREAT SERPL-MCNC: 1.22 MG/DL — SIGNIFICANT CHANGE UP (ref 0.5–1.3)
GLUCOSE SERPL-MCNC: 283 MG/DL — HIGH (ref 70–99)
HBA1C BLD-MCNC: 11.1 % — HIGH (ref 4–5.6)
HCT VFR BLD CALC: 40.3 % — SIGNIFICANT CHANGE UP (ref 39–50)
HGB BLD-MCNC: 12.6 G/DL — LOW (ref 13–17)
MAGNESIUM SERPL-MCNC: 2.1 MG/DL — SIGNIFICANT CHANGE UP (ref 1.6–2.6)
MCHC RBC-ENTMCNC: 28.5 PG — SIGNIFICANT CHANGE UP (ref 27–34)
MCHC RBC-ENTMCNC: 31.3 % — LOW (ref 32–36)
MCV RBC AUTO: 91.2 FL — SIGNIFICANT CHANGE UP (ref 80–100)
NRBC # FLD: 0 — SIGNIFICANT CHANGE UP
PHOSPHATE SERPL-MCNC: 4 MG/DL — SIGNIFICANT CHANGE UP (ref 2.5–4.5)
PLATELET # BLD AUTO: 237 K/UL — SIGNIFICANT CHANGE UP (ref 150–400)
PMV BLD: 8.7 FL — SIGNIFICANT CHANGE UP (ref 7–13)
POTASSIUM SERPL-MCNC: 4.7 MMOL/L — SIGNIFICANT CHANGE UP (ref 3.5–5.3)
POTASSIUM SERPL-SCNC: 4.7 MMOL/L — SIGNIFICANT CHANGE UP (ref 3.5–5.3)
RBC # BLD: 4.42 M/UL — SIGNIFICANT CHANGE UP (ref 4.2–5.8)
RBC # FLD: 14.5 % — SIGNIFICANT CHANGE UP (ref 10.3–14.5)
SODIUM SERPL-SCNC: 136 MMOL/L — SIGNIFICANT CHANGE UP (ref 135–145)
SPECIMEN SOURCE: SIGNIFICANT CHANGE UP
WBC # BLD: 4.58 K/UL — SIGNIFICANT CHANGE UP (ref 3.8–10.5)
WBC # FLD AUTO: 4.58 K/UL — SIGNIFICANT CHANGE UP (ref 3.8–10.5)

## 2018-03-21 PROCEDURE — 99233 SBSQ HOSP IP/OBS HIGH 50: CPT | Mod: GC

## 2018-03-21 RX ORDER — INSULIN GLARGINE 100 [IU]/ML
37.5 INJECTION, SOLUTION SUBCUTANEOUS
Qty: 0 | Refills: 0 | Status: DISCONTINUED | OUTPATIENT
Start: 2018-03-21 | End: 2018-03-21

## 2018-03-21 RX ORDER — CLONAZEPAM 1 MG
0.5 TABLET ORAL DAILY
Qty: 0 | Refills: 0 | Status: DISCONTINUED | OUTPATIENT
Start: 2018-03-22 | End: 2018-03-22

## 2018-03-21 RX ORDER — DEXAMETHASONE 0.5 MG/5ML
10 ELIXIR ORAL EVERY 6 HOURS
Qty: 0 | Refills: 0 | Status: DISCONTINUED | OUTPATIENT
Start: 2018-03-21 | End: 2018-03-21

## 2018-03-21 RX ORDER — INSULIN GLARGINE 100 [IU]/ML
38 INJECTION, SOLUTION SUBCUTANEOUS
Qty: 0 | Refills: 0 | Status: DISCONTINUED | OUTPATIENT
Start: 2018-03-21 | End: 2018-03-22

## 2018-03-21 RX ORDER — FLUTICASONE PROPIONATE 50 MCG
1 SPRAY, SUSPENSION NASAL
Qty: 0 | Refills: 0 | Status: DISCONTINUED | OUTPATIENT
Start: 2018-03-21 | End: 2018-03-22

## 2018-03-21 RX ORDER — GABAPENTIN 400 MG/1
400 CAPSULE ORAL THREE TIMES A DAY
Qty: 0 | Refills: 0 | Status: DISCONTINUED | OUTPATIENT
Start: 2018-03-21 | End: 2018-03-22

## 2018-03-21 RX ORDER — SIMVASTATIN 20 MG/1
20 TABLET, FILM COATED ORAL AT BEDTIME
Qty: 0 | Refills: 0 | Status: DISCONTINUED | OUTPATIENT
Start: 2018-03-21 | End: 2018-03-22

## 2018-03-21 RX ORDER — INSULIN GLARGINE 100 [IU]/ML
35 INJECTION, SOLUTION SUBCUTANEOUS
Qty: 0 | Refills: 0 | Status: DISCONTINUED | OUTPATIENT
Start: 2018-03-21 | End: 2018-03-21

## 2018-03-21 RX ADMIN — Medication 10 UNIT(S): at 13:28

## 2018-03-21 RX ADMIN — Medication 1 SPRAY(S): at 18:47

## 2018-03-21 RX ADMIN — LIDOCAINE 1 PATCH: 4 CREAM TOPICAL at 01:00

## 2018-03-21 RX ADMIN — Medication 4: at 18:14

## 2018-03-21 RX ADMIN — BUDESONIDE AND FORMOTEROL FUMARATE DIHYDRATE 2 PUFF(S): 160; 4.5 AEROSOL RESPIRATORY (INHALATION) at 21:40

## 2018-03-21 RX ADMIN — Medication 0.8 MILLIGRAM(S): at 13:33

## 2018-03-21 RX ADMIN — Medication 25 MILLIGRAM(S): at 18:14

## 2018-03-21 RX ADMIN — LIDOCAINE 1 PATCH: 4 CREAM TOPICAL at 13:28

## 2018-03-21 RX ADMIN — CARBIDOPA AND LEVODOPA 1 TABLET(S): 25; 100 TABLET ORAL at 06:03

## 2018-03-21 RX ADMIN — RIVAROXABAN 20 MILLIGRAM(S): KIT at 18:13

## 2018-03-21 RX ADMIN — Medication 200 MILLIGRAM(S): at 18:11

## 2018-03-21 RX ADMIN — Medication 15 MILLIGRAM(S): at 06:01

## 2018-03-21 RX ADMIN — SIMVASTATIN 20 MILLIGRAM(S): 20 TABLET, FILM COATED ORAL at 21:45

## 2018-03-21 RX ADMIN — NYSTATIN CREAM 1 APPLICATION(S): 100000 CREAM TOPICAL at 18:15

## 2018-03-21 RX ADMIN — NYSTATIN CREAM 1 APPLICATION(S): 100000 CREAM TOPICAL at 06:03

## 2018-03-21 RX ADMIN — Medication 100 MILLIGRAM(S): at 06:01

## 2018-03-21 RX ADMIN — INSULIN GLARGINE 38 UNIT(S): 100 INJECTION, SOLUTION SUBCUTANEOUS at 22:36

## 2018-03-21 RX ADMIN — Medication 200 MILLIGRAM(S): at 14:14

## 2018-03-21 RX ADMIN — BUDESONIDE AND FORMOTEROL FUMARATE DIHYDRATE 2 PUFF(S): 160; 4.5 AEROSOL RESPIRATORY (INHALATION) at 09:58

## 2018-03-21 RX ADMIN — Medication 10 UNIT(S): at 09:59

## 2018-03-21 RX ADMIN — Medication 100 MILLIGRAM(S): at 21:44

## 2018-03-21 RX ADMIN — GABAPENTIN 600 MILLIGRAM(S): 400 CAPSULE ORAL at 06:02

## 2018-03-21 RX ADMIN — TIOTROPIUM BROMIDE 1 CAPSULE(S): 18 CAPSULE ORAL; RESPIRATORY (INHALATION) at 11:22

## 2018-03-21 RX ADMIN — CARBIDOPA AND LEVODOPA 1 TABLET(S): 25; 100 TABLET ORAL at 21:40

## 2018-03-21 RX ADMIN — Medication 4: at 13:28

## 2018-03-21 RX ADMIN — Medication 25 MILLIGRAM(S): at 06:02

## 2018-03-21 RX ADMIN — Medication 15 MILLIGRAM(S): at 18:15

## 2018-03-21 RX ADMIN — FAMOTIDINE 20 MILLIGRAM(S): 10 INJECTION INTRAVENOUS at 21:40

## 2018-03-21 RX ADMIN — Medication 10 UNIT(S): at 18:14

## 2018-03-21 RX ADMIN — INSULIN GLARGINE 35 UNIT(S): 100 INJECTION, SOLUTION SUBCUTANEOUS at 10:04

## 2018-03-21 RX ADMIN — Medication 100 MILLIGRAM(S): at 13:25

## 2018-03-21 RX ADMIN — CARBIDOPA AND LEVODOPA 1 TABLET(S): 25; 100 TABLET ORAL at 14:14

## 2018-03-21 RX ADMIN — Medication 4: at 09:59

## 2018-03-21 RX ADMIN — GABAPENTIN 400 MILLIGRAM(S): 400 CAPSULE ORAL at 14:17

## 2018-03-21 RX ADMIN — DIVALPROEX SODIUM 500 MILLIGRAM(S): 500 TABLET, DELAYED RELEASE ORAL at 21:40

## 2018-03-21 RX ADMIN — Medication 0.5 MILLIGRAM(S): at 06:25

## 2018-03-21 RX ADMIN — GABAPENTIN 400 MILLIGRAM(S): 400 CAPSULE ORAL at 21:44

## 2018-03-21 NOTE — DISCHARGE NOTE ADULT - SECONDARY DIAGNOSIS.
Asthma with COPD Type 2 diabetes mellitus with foot ulcer, with long-term current use of insulin Paroxysmal atrial fibrillation Parkinson disease BRIAN on CPAP Recurrent major depressive disorder, in partial remission

## 2018-03-21 NOTE — PROGRESS NOTE ADULT - ATTENDING COMMENTS
Patient seen and examined. Agree with above note by resident.    # non purulent cellulitis - C/w clinda for now. Demarcate the area. Follow up blood cultures. Appears to be improving today. Also component of venous stasis.   # radiculopathy - PT oleg recommends rehab. Will discuss with patient if he is amenable to going to rehab. Supportive measures with lidoderm patch, tramadol and gabapentin.   #COPD - not in exacerbation. C/w spiriva, symbicort, o2 as needed  #BRIAN - patient not compliant with cpap at home. Says he was drowsy yesterday. Will decrease gabapentin to 400TID. Patient did not receive any narcotics yesterday. Agreeable to use cpap at night now. Will monitor. If worsening encephalopathy, will check abg to r/o hypercapnia.   #paroxysmal afib - c/w xarelto   #parkinsons disease -  c/w sinemet    Plan discussed with patient.

## 2018-03-21 NOTE — DISCHARGE NOTE ADULT - PATIENT PORTAL LINK FT
You can access the [email or web address], offered by [facility], by registering with the following website: [email or web address]

## 2018-03-21 NOTE — DISCHARGE NOTE ADULT - CONDITIONS AT DISCHARGE
Mr qureshi is discharged , alert , oriented x 4, partial assistance for care and walking about with the Walker; Glucose is stable in the 200's;  both Lower extremities  are warn to touch and edema is 2+ .  He has MAD at the Abdominal Pannus and Groin are with Nystatin Powder.  The Left Foot Diabetic Ulcer gets Medihoney and coverage with Mepilex dressing.  Discharge Instructions are given to he and his wife.

## 2018-03-21 NOTE — DISCHARGE NOTE ADULT - CARE PLAN
Principal Discharge DX:	Cellulitis of lower extremity, unspecified laterality  Secondary Diagnosis:	Asthma with COPD  Secondary Diagnosis:	Type 2 diabetes mellitus with foot ulcer, with long-term current use of insulin  Secondary Diagnosis:	Paroxysmal atrial fibrillation  Secondary Diagnosis:	Parkinson disease  Secondary Diagnosis:	BRIAN on CPAP  Secondary Diagnosis:	Recurrent major depressive disorder, in partial remission Principal Discharge DX:	Cellulitis of lower extremity, unspecified laterality  Goal:	Treatment  Assessment and plan of treatment:	You were found to have cellulitis of the legs which improved with IV antibiotics. You will need to continue to take the clindamycin as directed orally three times a day. If your leg pain returns, you have fever or chills call you primary care physician or return to the ED. Please follow up with your primary care physician within  Secondary Diagnosis:	Asthma with COPD  Assessment and plan of treatment:	Be sure to take all asthma medications as prescribed, it is important that you are consistent and do not miss taking the ones that are meant to be taken daily, even if your breathing already feels well. If you have been prescribed steroids, also be sure to take those are prescribed. Be sure to follow up with your Primary Care Doctor or a Pulmonologist if you have one. If you do not already have an asthma action plan, please discuss establishing one with your outpatient Doctors.  Secondary Diagnosis:	Type 2 diabetes mellitus with foot ulcer, with long-term current use of insulin  Assessment and plan of treatment:	Type 2 diabetes is a disease that affects how your body uses glucose (sugar). Normally, when the blood sugar level increases, the pancreas makes more insulin. Insulin helps move sugar out of the blood so it can be used for energy. Type 2 diabetes develops because either the body cannot make enough insulin, or it cannot use the insulin correctly. After many years, your pancreas may stop making insulin.    Please see your PCP  to have your A1c checked every 3 months. You will need to return at least once each year to have your feet checked. You will need an eye exam once a year to check for retinopathy. You will also need urine tests every year to check for kidney problems. You may need tests to monitor for heart disease such as an EKG, stress test, blood pressure monitoring, and blood tests. Write down your questions so you remember to ask them during your visits.  Please monitor the wound on your foot as this may be a source of your recurrent infections. This is likely from poor circulation and poorly controlled diabetes.   Your insulin regimen was adjusted. Please take you insulin as directed.  Secondary Diagnosis:	Paroxysmal atrial fibrillation  Assessment and plan of treatment:	You were found to have an irregularly irregular rhythm called atrial fibrillation. You are on a medication for stroke prevention, called rivaroxaban. Continue medication for stroke prevention as advised. Please follow up with your cardiologist within 1 week of discharge.  Secondary Diagnosis:	Parkinson disease  Assessment and plan of treatment:	Please continue to take your medication as directed. Follow up with your outpatient neurologist.  Secondary Diagnosis:	BRIAN on CPAP  Assessment and plan of treatment:	Please follow up with your pulmonologist for management of obstructive sleep apnea with CPAP.  Secondary Diagnosis:	Recurrent major depressive disorder, in partial remission  Assessment and plan of treatment:	You have a history of depression, and should continue to take your home medications as prescribed. Please follow up in the outpatient setting with your primary care provider. Principal Discharge DX:	Cellulitis of lower extremity, unspecified laterality  Goal:	Treatment  Assessment and plan of treatment:	You were found to have cellulitis of the legs which improved with IV antibiotics. You will need to continue to take the clindamycin as directed orally three times a day. If your leg pain returns, you have fever or chills call you primary care physician or return to the ED. Please follow up with your primary care physician within  Secondary Diagnosis:	Asthma with COPD  Assessment and plan of treatment:	Be sure to take all asthma medications as prescribed, it is important that you are consistent and do not miss taking the ones that are meant to be taken daily, even if your breathing already feels well. If you have been prescribed steroids, also be sure to take those are prescribed. Be sure to follow up with your Primary Care Doctor or a Pulmonologist if you have one. If you do not already have an asthma action plan, please discuss establishing one with your outpatient Doctors.  Secondary Diagnosis:	Type 2 diabetes mellitus with foot ulcer, with long-term current use of insulin  Assessment and plan of treatment:	Type 2 diabetes is a disease that affects how your body uses glucose (sugar). Normally, when the blood sugar level increases, the pancreas makes more insulin. Insulin helps move sugar out of the blood so it can be used for energy. Type 2 diabetes develops because either the body cannot make enough insulin, or it cannot use the insulin correctly. After many years, your pancreas may stop making insulin.    Please see your PCP  to have your A1c checked every 3 months. You will need to return at least once each year to have your feet checked. You will need an eye exam once a year to check for retinopathy. You will also need urine tests every year to check for kidney problems. You may need tests to monitor for heart disease such as an EKG, stress test, blood pressure monitoring, and blood tests. Write down your questions so you remember to ask them during your visits.  Please monitor the wound on your foot as this may be a source of your recurrent infections. This is likely from poor circulation and poorly controlled diabetes.  Please call the podiatry to set up an initial appointment at [phone]  or whoever your primary care physician recommends.  Your insulin regimen was adjusted. Please take you insulin as directed.  Secondary Diagnosis:	Paroxysmal atrial fibrillation  Assessment and plan of treatment:	You were found to have an irregularly irregular rhythm called atrial fibrillation. You are on a medication for stroke prevention, called rivaroxaban. Continue medication for stroke prevention as advised. Please follow up with your cardiologist within 1 week of discharge.  Secondary Diagnosis:	Parkinson disease  Assessment and plan of treatment:	Please continue to take your medication as directed. Follow up with your outpatient neurologist.  Secondary Diagnosis:	BRIAN on CPAP  Assessment and plan of treatment:	Please follow up with your pulmonologist for management of obstructive sleep apnea with CPAP. With CPAP it is likely your energy will improve as well as your quality of sleep. It is an important intervention for your overall health.  Secondary Diagnosis:	Recurrent major depressive disorder, in partial remission  Assessment and plan of treatment:	You have a history of depression, and should continue to take your home medications as prescribed. Please follow up in the outpatient setting with your primary care provider.

## 2018-03-21 NOTE — PROGRESS NOTE ADULT - SUBJECTIVE AND OBJECTIVE BOX
Jonathan Gilbert  PGY 1  Care Model B  Pager [phone]    Patient is a 62y old  Male who presents with a chief complaint of cough and cellulitis of legs (20 Mar 2018 03:58)      SUBJECTIVE / OVERNIGHT EVENTS:    MEDICATIONS  (STANDING):  buDESOnide 160 MICROgram(s)/formoterol 4.5 MICROgram(s) Inhaler 2 Puff(s) Inhalation two times a day  busPIRone 15 milliGRAM(s) Oral two times a day  carbidopa/levodopa  25/100 1 Tablet(s) Oral three times a day  clindamycin IVPB 900 milliGRAM(s) IV Intermittent every 8 hours  clonazePAM Tablet 0.5 milliGRAM(s) Oral two times a day  dextrose 5%. 1000 milliLiter(s) (50 mL/Hr) IV Continuous <Continuous>  dextrose 50% Injectable 12.5 Gram(s) IV Push once  dextrose 50% Injectable 25 Gram(s) IV Push once  dextrose 50% Injectable 25 Gram(s) IV Push once  diVALproex  milliGRAM(s) Oral at bedtime  famotidine    Tablet 20 milliGRAM(s) Oral at bedtime  folic acid 0.8 milliGRAM(s) Oral daily  gabapentin 600 milliGRAM(s) Oral three times a day  insulin glargine Injectable (LANTUS) 45 Unit(s) SubCutaneous at bedtime  insulin lispro (HumaLOG) corrective regimen sliding scale   SubCutaneous three times a day before meals  insulin lispro (HumaLOG) corrective regimen sliding scale   SubCutaneous at bedtime  insulin lispro Injectable (HumaLOG) 10 Unit(s) SubCutaneous three times a day before meals  lidocaine   Patch 1 Patch Transdermal daily  metoprolol     tartrate 25 milliGRAM(s) Oral two times a day  nystatin Powder 1 Application(s) Topical two times a day  rivaroxaban 20 milliGRAM(s) Oral every 24 hours  tiotropium 18 MICROgram(s) Capsule 1 Capsule(s) Inhalation daily    MEDICATIONS  (PRN):  acetaminophen   Tablet. 650 milliGRAM(s) Oral every 6 hours PRN Moderate Pain (4 - 6)  dextrose Gel 1 Dose(s) Oral once PRN Blood Glucose LESS THAN 70 milliGRAM(s)/deciliter  glucagon  Injectable 1 milliGRAM(s) IntraMuscular once PRN Glucose LESS THAN 70 milligrams/deciliter  traMADol 25 milliGRAM(s) Oral every 6 hours PRN Severe Pain (7 - 10)      Vital Signs Last 24 Hrs  T(C): 37 (21 Mar 2018 05:25), Max: 37.2 (20 Mar 2018 07:57)  T(F): 98.6 (21 Mar 2018 05:25), Max: 99 (20 Mar 2018 07:57)  HR: 66 (21 Mar 2018 05:25) (66 - 78)  BP: 122/61 (21 Mar 2018 05:25) (121/68 - 148/81)  BP(mean): --  RR: 19 (21 Mar 2018 05:25) (19 - 20)  SpO2: 95% (21 Mar 2018 05:25) (95% - 98%)  CAPILLARY BLOOD GLUCOSE      POCT Blood Glucose.: 305 mg/dL (20 Mar 2018 21:54)  POCT Blood Glucose.: 270 mg/dL (20 Mar 2018 17:33)  POCT Blood Glucose.: 198 mg/dL (20 Mar 2018 12:33)  POCT Blood Glucose.: 268 mg/dL (20 Mar 2018 08:51)    I&O's Summary      PHYSICAL EXAM:  GENERAL: NAD, well-developed  HEAD:  Atraumatic, Normocephalic  EYES: EOMI, PERRLA, conjunctiva and sclera clear  NECK: Supple, No JVD  CHEST/LUNG: Clear to auscultation bilaterally; No wheeze  HEART: Regular rate and rhythm; No murmurs, rubs, or gallops  ABDOMEN: Soft, Nontender, Nondistended; Bowel sounds present  EXTREMITIES:  2+ Peripheral Pulses, No clubbing, cyanosis, or edema  PSYCH: AAOx3  NEUROLOGY: non-focal  SKIN: No rashes or lesions    LABS:                        12.6   4.58  )-----------( 237      ( 21 Mar 2018 06:20 )             40.3     03-19    137  |  96<L>  |  13  ----------------------------<  338<H>  4.7   |  31  |  1.22    Ca    8.3<L>      19 Mar 2018 23:00    TPro  6.7  /  Alb  3.4  /  TBili  0.6  /  DBili  x   /  AST  27  /  ALT  18  /  AlkPhos  58  03-19    PT/INR - ( 19 Mar 2018 23:00 )   PT: 11.3 SEC;   INR: 0.98          PTT - ( 19 Mar 2018 23:00 )  PTT:30.7 SEC  CARDIAC MARKERS ( 19 Mar 2018 23:00 )  x     / < 0.06 ng/mL / x     / 2.26 ng/mL / x          Urinalysis Basic - ( 19 Mar 2018 23:00 )    Color: YELLOW / Appearance: CLEAR / S.023 / pH: 6.5  Gluc: >1000 / Ketone: NEGATIVE  / Bili: NEGATIVE / Urobili: NORMAL mg/dL   Blood: NEGATIVE / Protein: 30 mg/dL / Nitrite: NEGATIVE   Leuk Esterase: SMALL / RBC: 2-5 / WBC 5-10   Sq Epi: OCC / Non Sq Epi: x / Bacteria: x        RADIOLOGY & ADDITIONAL TESTS:    Imaging Personally Reviewed:    Consultant(s) Notes Reviewed:      Care Discussed with Consultants/Other Providers: Jonathan Gilbert  PGY 1  Care Model B  Pager [phone]    Patient is a 62y old  Male who presents with a chief complaint of cough and cellulitis of legs (20 Mar 2018 03:58)      SUBJECTIVE / OVERNIGHT EVENTS:  Patient states that he is feeling well with no complaints. The bilateral leg pain has improved. He does not know if the redness has improved.   He states that he does not want to use CPAP despite risks until he has another sleep study.   He only continues to have a mild cough without sputum production.   He has no associated fever, chills, chest pain, palpitations, N/V, diarrhea.    MEDICATIONS  (STANDING):  buDESOnide 160 MICROgram(s)/formoterol 4.5 MICROgram(s) Inhaler 2 Puff(s) Inhalation two times a day  busPIRone 15 milliGRAM(s) Oral two times a day  carbidopa/levodopa  25/100 1 Tablet(s) Oral three times a day  clindamycin IVPB 900 milliGRAM(s) IV Intermittent every 8 hours  clonazePAM Tablet 0.5 milliGRAM(s) Oral two times a day  dextrose 5%. 1000 milliLiter(s) (50 mL/Hr) IV Continuous <Continuous>  dextrose 50% Injectable 12.5 Gram(s) IV Push once  dextrose 50% Injectable 25 Gram(s) IV Push once  dextrose 50% Injectable 25 Gram(s) IV Push once  diVALproex  milliGRAM(s) Oral at bedtime  famotidine    Tablet 20 milliGRAM(s) Oral at bedtime  folic acid 0.8 milliGRAM(s) Oral daily  gabapentin 600 milliGRAM(s) Oral three times a day  insulin glargine Injectable (LANTUS) 45 Unit(s) SubCutaneous at bedtime  insulin lispro (HumaLOG) corrective regimen sliding scale   SubCutaneous three times a day before meals  insulin lispro (HumaLOG) corrective regimen sliding scale   SubCutaneous at bedtime  insulin lispro Injectable (HumaLOG) 10 Unit(s) SubCutaneous three times a day before meals  lidocaine   Patch 1 Patch Transdermal daily  metoprolol     tartrate 25 milliGRAM(s) Oral two times a day  nystatin Powder 1 Application(s) Topical two times a day  rivaroxaban 20 milliGRAM(s) Oral every 24 hours  tiotropium 18 MICROgram(s) Capsule 1 Capsule(s) Inhalation daily    MEDICATIONS  (PRN):  acetaminophen   Tablet. 650 milliGRAM(s) Oral every 6 hours PRN Moderate Pain (4 - 6)  dextrose Gel 1 Dose(s) Oral once PRN Blood Glucose LESS THAN 70 milliGRAM(s)/deciliter  glucagon  Injectable 1 milliGRAM(s) IntraMuscular once PRN Glucose LESS THAN 70 milligrams/deciliter  traMADol 25 milliGRAM(s) Oral every 6 hours PRN Severe Pain (7 - 10)      Vital Signs Last 24 Hrs  T(C): 37 (21 Mar 2018 05:25), Max: 37.2 (20 Mar 2018 07:57)  T(F): 98.6 (21 Mar 2018 05:25), Max: 99 (20 Mar 2018 07:57)  HR: 66 (21 Mar 2018 05:25) (66 - 78)  BP: 122/61 (21 Mar 2018 05:25) (121/68 - 148/81)  BP(mean): --  RR: 19 (21 Mar 2018 05:25) (19 - 20)  SpO2: 95% (21 Mar 2018 05:25) (95% - 98%)  CAPILLARY BLOOD GLUCOSE      POCT Blood Glucose.: 305 mg/dL (20 Mar 2018 21:54)  POCT Blood Glucose.: 270 mg/dL (20 Mar 2018 17:33)  POCT Blood Glucose.: 198 mg/dL (20 Mar 2018 12:33)  POCT Blood Glucose.: 268 mg/dL (20 Mar 2018 08:51)    I&O's Summary      PHYSICAL EXAM:  GENERAL: NAD, morbidly obese  	HEAD:  poor dentition, Atraumatic, Normocephalic  	EYES: EOMI, PERRLA, conjunctiva and sclera clear  	NECK: Supple, No JVD, large neck circumference  	CHEST/LUNG: Mild expiratory wheezes; 92% on RA; 18bpm; no supra/intrathoracic retractions.  	HEART: Distant heart sounds. Irregularly irregular; regular rhythm; No murmurs, rubs, or gallops  	ABDOMEN: Obese abdomen; Soft, Nontender, Nondistended; Bowel sounds present  	EXTREMITIES: Diffuse erythema of legs bilaterally with improved TTP extending to the knees; no hair on legs; ulceration on left lateral foot without purulent drainage; decreased Peripheral Pulses; decreased sensation of feet bilaterally No clubbing, cyanosis, crepitus or edema; mild cogwheel rigidity on right arm  	PSYCH: AAOx3  	NEUROLOGY: non-focal  SKIN: erythema of abdomen without TTP with skin breakdown    LABS:                        12.6   4.58  )-----------( 237      ( 21 Mar 2018 06:20 )             40.3         137  |  96<L>  |  13  ----------------------------<  338<H>  4.7   |  31  |  1.22    Ca    8.3<L>      19 Mar 2018 23:00    TPro  6.7  /  Alb  3.4  /  TBili  0.6  /  DBili  x   /  AST  27  /  ALT  18  /  AlkPhos  58  -    PT/INR - ( 19 Mar 2018 23:00 )   PT: 11.3 SEC;   INR: 0.98          PTT - ( 19 Mar 2018 23:00 )  PTT:30.7 SEC  CARDIAC MARKERS ( 19 Mar 2018 23:00 )  x     / < 0.06 ng/mL / x     / 2.26 ng/mL / x          Urinalysis Basic - ( 19 Mar 2018 23:00 )    Color: YELLOW / Appearance: CLEAR / S.023 / pH: 6.5  Gluc: >1000 / Ketone: NEGATIVE  / Bili: NEGATIVE / Urobili: NORMAL mg/dL   Blood: NEGATIVE / Protein: 30 mg/dL / Nitrite: NEGATIVE   Leuk Esterase: SMALL / RBC: 2-5 / WBC 5-10   Sq Epi: OCC / Non Sq Epi: x / Bacteria: x

## 2018-03-21 NOTE — DISCHARGE NOTE ADULT - PLAN OF CARE
Treatment You were found to have cellulitis of the legs which improved with IV antibiotics. You will need to continue to take the clindamycin as directed orally three times a day. If your leg pain returns, you have fever or chills call you primary care physician or return to the ED. Please follow up with your primary care physician within Be sure to take all asthma medications as prescribed, it is important that you are consistent and do not miss taking the ones that are meant to be taken daily, even if your breathing already feels well. If you have been prescribed steroids, also be sure to take those are prescribed. Be sure to follow up with your Primary Care Doctor or a Pulmonologist if you have one. If you do not already have an asthma action plan, please discuss establishing one with your outpatient Doctors. Type 2 diabetes is a disease that affects how your body uses glucose (sugar). Normally, when the blood sugar level increases, the pancreas makes more insulin. Insulin helps move sugar out of the blood so it can be used for energy. Type 2 diabetes develops because either the body cannot make enough insulin, or it cannot use the insulin correctly. After many years, your pancreas may stop making insulin.    Please see your PCP  to have your A1c checked every 3 months. You will need to return at least once each year to have your feet checked. You will need an eye exam once a year to check for retinopathy. You will also need urine tests every year to check for kidney problems. You may need tests to monitor for heart disease such as an EKG, stress test, blood pressure monitoring, and blood tests. Write down your questions so you remember to ask them during your visits.  Please monitor the wound on your foot as this may be a source of your recurrent infections. This is likely from poor circulation and poorly controlled diabetes.   Your insulin regimen was adjusted. Please take you insulin as directed. You were found to have an irregularly irregular rhythm called atrial fibrillation. You are on a medication for stroke prevention, called rivaroxaban. Continue medication for stroke prevention as advised. Please follow up with your cardiologist within 1 week of discharge. Please continue to take your medication as directed. Follow up with your outpatient neurologist. Please follow up with your pulmonologist for management of obstructive sleep apnea with CPAP. You have a history of depression, and should continue to take your home medications as prescribed. Please follow up in the outpatient setting with your primary care provider. Type 2 diabetes is a disease that affects how your body uses glucose (sugar). Normally, when the blood sugar level increases, the pancreas makes more insulin. Insulin helps move sugar out of the blood so it can be used for energy. Type 2 diabetes develops because either the body cannot make enough insulin, or it cannot use the insulin correctly. After many years, your pancreas may stop making insulin.    Please see your PCP  to have your A1c checked every 3 months. You will need to return at least once each year to have your feet checked. You will need an eye exam once a year to check for retinopathy. You will also need urine tests every year to check for kidney problems. You may need tests to monitor for heart disease such as an EKG, stress test, blood pressure monitoring, and blood tests. Write down your questions so you remember to ask them during your visits.  Please monitor the wound on your foot as this may be a source of your recurrent infections. This is likely from poor circulation and poorly controlled diabetes.  Please call the podiatry to set up an initial appointment at [phone]  or whoever your primary care physician recommends.  Your insulin regimen was adjusted. Please take you insulin as directed. Please follow up with your pulmonologist for management of obstructive sleep apnea with CPAP. With CPAP it is likely your energy will improve as well as your quality of sleep. It is an important intervention for your overall health.

## 2018-03-21 NOTE — ADVANCED PRACTICE NURSE CONSULT - REASON FOR CONSULT
Patient seen on skin care rounds after wound care referral received for assessment of skin impairment and recommendations of topical management. Chart reviewed: Serum albumin 3.4g/dL, Total Serum Protein 6.7g/dL, HgbA1C 11.1%, Sandeep 18, BMI 44.3kg/m2, patient interviewed. Patient H/O TIIDM, Afib on rivaroxaban s/p ablation, morbid obesity, Parkinson's disease, COPD on 4LNC, asthma, BRIAN on Bilevel uses inconsistently, HTN, presenting with cough via EMS and subsequently found to have possible cellulitis. Patient without leukocytosis with mild fever in ED, and pain. Patient with signs of venous stasis and a diabetic foot ulcer. Unlikely DVT given bilateral erythema and pt on rivaroxaban. Improving on IV antibiotics.

## 2018-03-21 NOTE — ADVANCED PRACTICE NURSE CONSULT - ASSESSMENT
General: A&O x 4, requiring supplemental oxygen via nasal cannula, ambulates with walker, continent of urine and stool. Skin warm, dry with increased moisture in intertriginous folds, adequate skin turgor.    Vascular: Left foot bone deformity. Bilateral lower extremities with faint hemosiderin staining and blanchable erythema, consistent with venous stasis dermatitis. Multiple intact stable scabs of anterior lower legs. No temperature changes, +3 pitting edema. Thickened-yellow first toe nail bilaterally, hyperpigmented right foot 4th toenail. Sluggish capillary refill <3 seconds. +2 DP/PT pulses, with biphasic doppler sounds. (-) elevational pallor and dependent rubbor. Denies numbness, c/o tingling at times.    Circumferential measurements of bilateral lower extremities:  Left foot- 25cm    Left ankle- 23cm     Left calf (15 cm below knee)- 39.5cm  Right foot-24.5cm    Right ankle- 23cm    Right calf (15cm below knee)-42cm     Left lateral foot- diabetic foot ulcer- [id number], 50% red-pink moist granulation tissue, 50% pale-pink moist flat agranular. Scant serous drainage, no odor. Periwound skin with callus ring, no induration, no increased warmth, no edema noted. Goal of care: decrease bioburden, maintain moist environment to promote wound healing, protect periwound skin.    Moisture associated dermatitis- bilateral abdominal pannus and groin, increased moisture with erythema bilaterally, Left abdominal pannus well demarcated erythema with purple hue suspicious of candidiasis. General: A&O x 4, requiring supplemental oxygen via nasal cannula, ambulates with walker, continent of urine and stool. Skin warm, dry with increased moisture in intertriginous folds, adequate skin turgor.    Vascular: Left foot bone deformity. Bilateral lower extremities with faint hemosiderin staining and blanchable erythema, consistent with venous stasis dermatitis. Multiple intact stable scabs of anterior lower legs. No temperature changes, +3 pitting edema. Thickened-yellow first toe nail bilaterally, hyperpigmented right foot 4th toenail. Sluggish capillary refill <3 seconds. +2 DP/PT pulses, with biphasic doppler sounds. (-) elevational pallor and dependent rubbor. Denies numbness, c/o tingling at times. Pt c/o pruritis of bilateral lower legs.     Circumferential measurements of bilateral lower extremities:  Left foot- 25cm    Left ankle- 23cm     Left calf (15 cm below knee)- 39.5cm  Right foot-24.5cm    Right ankle- 23cm    Right calf (15cm below knee)-42cm     Left lateral foot- diabetic foot ulcer- 2.0ydk8maq1.2cm, 50% red-pink moist granulation tissue, 50% pale-pink moist flat agranular. Scant serous drainage, no odor. Periwound skin with callus ring, no induration, no increased warmth, no edema noted. Goal of care: decrease bioburden, maintain moist environment to promote wound healing, protect periwound skin.    Moisture associated dermatitis- bilateral abdominal pannus and groin, increased moisture with erythema bilaterally, Left abdominal pannus well demarcated erythema with purple hue suspicious of candidiasis.

## 2018-03-21 NOTE — ADVANCED PRACTICE NURSE CONSULT - RECOMMEDATIONS
Follow up outpatient Podiatry for Diabetic foot ulcer of Left lateral foot.  Diabetes Education for diet and glucose control, HgbA1C 11.1%.  Rule out DVT of Right lower leg.     Topical Recommendations:  Sween 24 to bilateral lower legs.     Left lateral foot- diabetic foot ulcer- Cleanse wound and periwound skin with SAF-clens, pat dry. Apply Liquid barrier film to periwound skin. Apply Medihoney gel to wound base, cover with silicone foam with border. Change daily.     Moisture associated dermatitis- Cleanser intertriginous folds with soap and water, dry well. Dry well. Apply antifungal powder to beneath abdominal pannus, apply Interdry textile sheeting, under intertriginous folds leaving 2 inches exposed at ends to wick, remove to wash & dry affected area, then replace. Individual sheeting may be used for up to 5 days unless soiled.     Continue low air loss bed therapy, continue heel elevation with Z-flex fluidized positioning boots while in bed, continue to encourage turning & repositioning q2h while in bed, continue moisture management as recommended above & single breathable pad, continue measures to decrease friction/shear/pressure.     Continue with nutritional support as per dietary/orders.    Findings and plan discussed with patient. Patient educated on topical wound therapy and on dietary recommendations (high protein, low sugar diet) to optimize wound healing. Questions answers.     Please contact Wound Care Service Line if we can be of further assistance (ext [phone]). Follow up outpatient Podiatry for Diabetic foot ulcer of Left lateral foot.   Compression stockings for bilateral lower legs.   Diabetes Education for diet and glucose control, HgbA1C 11.1%.  Rule out DVT of Right lower leg.     Topical Recommendations:  Sween 24 to bilateral lower legs. Consider topical steroidal cream for bilateral lower legs; venous dermatitis.     Left lateral foot- diabetic foot ulcer- Cleanse wound and periwound skin with SAF-clens, pat dry. Apply Liquid barrier film to periwound skin. Apply Medihoney gel to wound base, cover with silicone foam with border. Change daily.     Moisture associated dermatitis- Cleanser intertriginous folds with soap and water, dry well. Dry well. Apply antifungal powder to beneath abdominal pannus, apply Interdry textile sheeting, under intertriginous folds leaving 2 inches exposed at ends to wick, remove to wash & dry affected area, then replace. Individual sheeting may be used for up to 5 days unless soiled.     Continue low air loss bed therapy, continue heel elevation with Z-flex fluidized positioning boots while in bed, continue to encourage turning & repositioning q2h while in bed, continue moisture management as recommended above & single breathable pad, continue measures to decrease friction/shear/pressure.     Continue with nutritional support as per dietary/orders.    Findings and plan discussed with patient. Patient educated on topical wound therapy and on dietary recommendations (high protein, low sugar diet) to optimize wound healing. Questions answers.     Please contact Wound Care Service Line if we can be of further assistance (ext [phone]).

## 2018-03-21 NOTE — DISCHARGE NOTE ADULT - MEDICATION SUMMARY - MEDICATIONS TO CHANGE
I will SWITCH the dose or number of times a day I take the medications listed below when I get home from the hospital:    Basaglar KwikPen 100 units/mL subcutaneous solution  -- 37.5 unit(s) subcutaneous 2 times a day    HumaLOG KwikPen 100 units/mL injectable solution  -- 10  injectable 3 times a day (before meals) I will SWITCH the dose or number of times a day I take the medications listed below when I get home from the hospital:    acetaminophen 325 mg oral tablet  -- 2 tab(s) by mouth every 6 hours    Basaglar KwikPen 100 units/mL subcutaneous solution  -- 37.5 unit(s) subcutaneous 2 times a day    HumaLOG KwikPen 100 units/mL injectable solution  -- 10  injectable 3 times a day (before meals)

## 2018-03-21 NOTE — DISCHARGE NOTE ADULT - HOSPITAL COURSE
62M PMHx TIIDM, Afib on rivaroxaban s/p ablation, morbid obesity, Parkinson's disease, COPD on 4LNC, asthma, BRIAN on Bilevel uses inconsistently, HTN, DM,n presenting with cough via EMS and subsequently found to have possible cellulitis. Patient without leukocytosis with mild fever in ED, and pain. Patient with signs of venous stasis and a diabetic foot ulcer. Unlikely DVT given bilateral erythema and pt on rivaroxaban. 62M PMHx TIIDM, Afib on rivaroxaban s/p ablation, morbid obesity, Parkinson's disease, COPD on 4LNC, asthma, BRIAN on Bilevel uses inconsistently, HTN, DM,n presenting with cough via EMS and subsequently found to have possible cellulitis. Patient without leukocytosis with mild fever in ED, and pain. Patient with signs of venous stasis and a diabetic foot ulcer. Unlikely DVT given bilateral erythema and pt on rivaroxaban.   Patient was treated for cellulitis with IV clindamycin for 3 days and was sent home on oral clindamycin 450mg TID for a total of 7 days. Blood cultures were negative. Patient was seen by wound care who recommended close follow up with podiatry. Nystatin powder was provided to skin breakdown under pannus.   Patient's A1c was found to be 11 with elevated glucose levels in the hospital. His insulin regimen was adjusted to 38U glargine BID and lispro 12U TID and patient was educated about diabetes as well as importance of medications, exercise and diet. He will need to follow up with PCP for management. Otherwise no changes were made to the patient's home regimen.  Patient has multiple comorbidities that need to be followed closely. Refractory depression, no adjustment made to medications, will need to be followed by psychiatry; BRIAN/COPD with poor adherence to CPAP will need to be followed by pulmonary; poorly controlled TIIDM with complications; parkinsonism to be follow by neurology; afib that needs to be follow by cardiology as well as possible OP TTE for assessment of cardiac function. 62M PMHx TIIDM, Afib on rivaroxaban s/p ablation, morbid obesity, Parkinson's disease, COPD on 4LNC, asthma, BRIAN on Bilevel uses inconsistently, HTN, DM,n presenting with cough via EMS and subsequently found to have possible cellulitis. Patient without leukocytosis with mild fever in ED, and pain. Patient with signs of venous stasis and a diabetic foot ulcer. Unlikely DVT given bilateral erythema and pt on rivaroxaban.   Patient was treated for cellulitis with IV clindamycin for 3 days and was sent home on oral clindamycin 450mg TID for a total of 7 days. Blood cultures were negative. Patient was seen by wound care who recommended close follow up with podiatry. Nystatin powder was provided to skin breakdown under pannus. It was recommended that patient go to [facility] however patient refused and wanted instead to do PT at home despite recommendations otherwise.   Patient's A1c was found to be 11 with elevated glucose levels in the hospital. His insulin regimen was adjusted to 38U glargine BID and lispro 12U TID and patient was educated about diabetes as well as importance of medications, exercise and diet. He will need to follow up with PCP for management. Otherwise no changes were made to the patient's home regimen.  Patient has multiple comorbidities that need to be followed closely. Refractory depression, no adjustment made to medications, will need to be followed by psychiatry; BRIAN/COPD with poor adherence to CPAP will need to be followed by pulmonary; poorly controlled TIIDM with complications; parkinsonism to be follow by neurology; afib that needs to be follow by cardiology as well as possible OP TTE for assessment of cardiac function.

## 2018-03-21 NOTE — PROGRESS NOTE ADULT - PROBLEM SELECTOR PLAN 1
Patient without leukocytosis with mild fever in ED, and pain. Patient with signs of venous stasis and a diabetic foot ulcer. Unlikely DVT given bilateral erythema and pt on rivaroxaban. Was previously on doxycycline without improvement. Currently improving on IV antibiotics.  -f/u blood cultures  -will continue clindamycin 900 mg q8 day 2 (day 4 with doxycycline)

## 2018-03-21 NOTE — DISCHARGE NOTE ADULT - MEDICATION SUMMARY - MEDICATIONS TO TAKE
I will START or STAY ON the medications listed below when I get home from the hospital:    acetaminophen 325 mg oral tablet  -- 2 tab(s) by mouth every 6 hours  -- Indication: For Cellulitis of lower extremity, unspecified laterality    Xarelto 20 mg oral tablet  -- 1 tab(s) by mouth once a day (in the evening)  -- Indication: For Paroxysmal atrial fibrillation    clonazePAM 0.5 mg oral tablet  -- 1 tab(s) by mouth 2 times a day  -- Indication: For Anxiety    divalproex sodium 500 mg oral tablet, extended release  -- 1 tab(s) by mouth once a day (at bedtime)  -- Indication: For Depression    gabapentin 600 mg oral tablet  -- 1 tab(s) by mouth 2 times a day  -- Indication: For Depression    Basaglar KwikPen 100 units/mL subcutaneous solution  -- 38 unit(s) subcutaneous 2 times a day   -- Indication: For Type 2 diabetes mellitus with foot ulcer, with long-term current use of insulin    HumaLOG KwikPen 100 units/mL injectable solution  -- 12 unit(s) injectable 3 times a day (before meals)   -- Indication: For Type 2 diabetes mellitus with foot ulcer, with long-term current use of insulin    simvastatin 20 mg oral tablet  -- 1 tab(s) by mouth once a day (at bedtime)  -- Indication: For Type 2 diabetes mellitus with foot ulcer, with long-term current use of insulin    carbidopa-levodopa 25 mg-100 mg oral tablet  -- 1 tab(s) by mouth 3 times a day  -- Indication: For Parkinson disease    busPIRone 15 mg oral tablet  -- 1 tab(s) by mouth 2 times a day  -- Indication: For Depression    Metoprolol Tartrate 25 mg oral tablet  -- 1 tab(s) by mouth 2 times a day  -- Indication: For Paroxysmal atrial fibrillation    Spiriva Respimat 2.5 mcg/inh inhalation aerosol  -- 2 puff(s) inhaled once a day  -- Indication: For COPD (chronic obstructive pulmonary disease)    Symbicort 160 mcg-4.5 mcg/inh inhalation aerosol  -- 2 puff(s) inhaled 2 times a day  -- Indication: For COPD (chronic obstructive pulmonary disease)    nystatin 100,000 units/g topical powder  -- 1 application on skin 2 times a day. Please apply around redness of legs  -- Indication: For Rash    Creon 12,000 units oral delayed release capsule  -- 1 cap(s) by mouth 3 times a day  -- Indication: For Pancreatic    guaiFENesin 100 mg/5 mL oral liquid  -- 10 milliliter(s) by mouth every 6 hours  -- Indication: For COugh    raNITIdine 300 mg oral tablet  -- 1 tab(s) by mouth once a day (at bedtime)  -- Indication: For GERD    clindamycin 150 mg oral capsule  -- 3 cap(s) by mouth 3 times a day   -- Finish all this medication unless otherwise directed by prescriber.  Medication should be taken with plenty of water.    -- Indication: For Cellulitis    folic acid 0.8 mg oral tablet  -- 1 tab(s) by mouth 2 times a day  -- Indication: For Vitamins I will START or STAY ON the medications listed below when I get home from the hospital:    acetaminophen 325 mg oral tablet  -- 2 tab(s) by mouth every 8 hours, As Needed  -- Indication: For Leg pain    Xarelto 20 mg oral tablet  -- 1 tab(s) by mouth once a day (in the evening)  -- Indication: For Paroxysmal atrial fibrillation    clonazePAM 0.5 mg oral tablet  -- 1 tab(s) by mouth 2 times a day  -- Indication: For Anxiety    divalproex sodium 500 mg oral tablet, extended release  -- 1 tab(s) by mouth once a day (at bedtime)  -- Indication: For Depression    gabapentin 600 mg oral tablet  -- 1 tab(s) by mouth 2 times a day  -- Indication: For Depression    Basaglar KwikPen 100 units/mL subcutaneous solution  -- 38 unit(s) subcutaneous 2 times a day   -- Indication: For Type 2 diabetes mellitus with foot ulcer, with long-term current use of insulin    HumaLOG KwikPen 100 units/mL injectable solution  -- 12 unit(s) injectable 3 times a day (before meals)   -- Indication: For Type 2 diabetes mellitus with foot ulcer, with long-term current use of insulin    simvastatin 20 mg oral tablet  -- 1 tab(s) by mouth once a day (at bedtime)  -- Indication: For Type 2 diabetes mellitus with foot ulcer, with long-term current use of insulin    carbidopa-levodopa 25 mg-100 mg oral tablet  -- 1 tab(s) by mouth 3 times a day  -- Indication: For Parkinson disease    busPIRone 15 mg oral tablet  -- 1 tab(s) by mouth 2 times a day  -- Indication: For Depression    Metoprolol Tartrate 25 mg oral tablet  -- 1 tab(s) by mouth 2 times a day  -- Indication: For Paroxysmal atrial fibrillation    Spiriva Respimat 2.5 mcg/inh inhalation aerosol  -- 2 puff(s) inhaled once a day  -- Indication: For COPD (chronic obstructive pulmonary disease)    Symbicort 160 mcg-4.5 mcg/inh inhalation aerosol  -- 2 puff(s) inhaled 2 times a day  -- Indication: For COPD (chronic obstructive pulmonary disease)    nystatin 100,000 units/g topical powder  -- 1 application on skin 2 times a day. Please apply around redness of legs  -- Indication: For Rash    Creon 12,000 units oral delayed release capsule  -- 1 cap(s) by mouth 3 times a day  -- Indication: For Pancreatic    guaiFENesin 100 mg/5 mL oral liquid  -- 10 milliliter(s) by mouth every 6 hours  -- Indication: For COugh    raNITIdine 300 mg oral tablet  -- 1 tab(s) by mouth once a day (at bedtime)  -- Indication: For GERD    clindamycin 150 mg oral capsule  -- 3 cap(s) by mouth 3 times a day   -- Finish all this medication unless otherwise directed by prescriber.  Medication should be taken with plenty of water.    -- Indication: For Cellulitis    folic acid 0.8 mg oral tablet  -- 1 tab(s) by mouth 2 times a day  -- Indication: For Vitamins

## 2018-03-22 VITALS
DIASTOLIC BLOOD PRESSURE: 55 MMHG | HEART RATE: 68 BPM | SYSTOLIC BLOOD PRESSURE: 127 MMHG | RESPIRATION RATE: 20 BRPM | OXYGEN SATURATION: 96 %

## 2018-03-22 PROCEDURE — 99239 HOSP IP/OBS DSCHRG MGMT >30: CPT

## 2018-03-22 RX ORDER — SIMVASTATIN 20 MG/1
1 TABLET, FILM COATED ORAL
Qty: 14 | Refills: 0 | OUTPATIENT
Start: 2018-03-22 | End: 2018-04-04

## 2018-03-22 RX ORDER — NYSTATIN CREAM 100000 [USP'U]/G
1 CREAM TOPICAL
Qty: 5 | Refills: 0 | OUTPATIENT
Start: 2018-03-22 | End: 2018-04-04

## 2018-03-22 RX ORDER — ACETAMINOPHEN 500 MG
2 TABLET ORAL
Qty: 0 | Refills: 0 | COMMUNITY

## 2018-03-22 RX ORDER — INSULIN LISPRO 100/ML
12 VIAL (ML) SUBCUTANEOUS
Qty: 20 | Refills: 0 | OUTPATIENT
Start: 2018-03-22 | End: 2018-04-20

## 2018-03-22 RX ORDER — INSULIN LISPRO 100/ML
10 VIAL (ML) SUBCUTANEOUS
Qty: 0 | Refills: 0 | COMMUNITY

## 2018-03-22 RX ORDER — INSULIN LISPRO 100/ML
12 VIAL (ML) SUBCUTANEOUS
Qty: 0 | Refills: 0 | Status: DISCONTINUED | OUTPATIENT
Start: 2018-03-22 | End: 2018-03-22

## 2018-03-22 RX ORDER — INSULIN GLARGINE 100 [IU]/ML
37.5 INJECTION, SOLUTION SUBCUTANEOUS
Qty: 0 | Refills: 0 | COMMUNITY

## 2018-03-22 RX ORDER — INSULIN GLARGINE 100 [IU]/ML
38 INJECTION, SOLUTION SUBCUTANEOUS
Qty: 20 | Refills: 0 | OUTPATIENT
Start: 2018-03-22 | End: 2018-04-20

## 2018-03-22 RX ADMIN — Medication 0.8 MILLIGRAM(S): at 12:06

## 2018-03-22 RX ADMIN — Medication 200 MILLIGRAM(S): at 05:53

## 2018-03-22 RX ADMIN — Medication 15 MILLIGRAM(S): at 05:53

## 2018-03-22 RX ADMIN — Medication 15 MILLIGRAM(S): at 18:36

## 2018-03-22 RX ADMIN — Medication 200 MILLIGRAM(S): at 18:36

## 2018-03-22 RX ADMIN — Medication 2: at 09:20

## 2018-03-22 RX ADMIN — Medication 200 MILLIGRAM(S): at 00:17

## 2018-03-22 RX ADMIN — Medication 6: at 13:07

## 2018-03-22 RX ADMIN — Medication 25 MILLIGRAM(S): at 05:53

## 2018-03-22 RX ADMIN — Medication 0.5 MILLIGRAM(S): at 12:06

## 2018-03-22 RX ADMIN — NYSTATIN CREAM 1 APPLICATION(S): 100000 CREAM TOPICAL at 05:54

## 2018-03-22 RX ADMIN — CARBIDOPA AND LEVODOPA 1 TABLET(S): 25; 100 TABLET ORAL at 05:57

## 2018-03-22 RX ADMIN — GABAPENTIN 400 MILLIGRAM(S): 400 CAPSULE ORAL at 13:08

## 2018-03-22 RX ADMIN — NYSTATIN CREAM 1 APPLICATION(S): 100000 CREAM TOPICAL at 18:36

## 2018-03-22 RX ADMIN — INSULIN GLARGINE 38 UNIT(S): 100 INJECTION, SOLUTION SUBCUTANEOUS at 11:13

## 2018-03-22 RX ADMIN — BUDESONIDE AND FORMOTEROL FUMARATE DIHYDRATE 2 PUFF(S): 160; 4.5 AEROSOL RESPIRATORY (INHALATION) at 09:00

## 2018-03-22 RX ADMIN — Medication 1 SPRAY(S): at 05:53

## 2018-03-22 RX ADMIN — Medication 25 MILLIGRAM(S): at 18:36

## 2018-03-22 RX ADMIN — Medication 12 UNIT(S): at 13:07

## 2018-03-22 RX ADMIN — Medication 12 UNIT(S): at 09:21

## 2018-03-22 RX ADMIN — LIDOCAINE 1 PATCH: 4 CREAM TOPICAL at 12:06

## 2018-03-22 RX ADMIN — Medication 1 SPRAY(S): at 18:36

## 2018-03-22 RX ADMIN — LIDOCAINE 1 PATCH: 4 CREAM TOPICAL at 01:24

## 2018-03-22 RX ADMIN — Medication 200 MILLIGRAM(S): at 12:06

## 2018-03-22 RX ADMIN — Medication 100 MILLIGRAM(S): at 13:11

## 2018-03-22 RX ADMIN — GABAPENTIN 400 MILLIGRAM(S): 400 CAPSULE ORAL at 05:53

## 2018-03-22 RX ADMIN — TIOTROPIUM BROMIDE 1 CAPSULE(S): 18 CAPSULE ORAL; RESPIRATORY (INHALATION) at 09:21

## 2018-03-22 RX ADMIN — Medication 100 MILLIGRAM(S): at 05:52

## 2018-03-22 RX ADMIN — CARBIDOPA AND LEVODOPA 1 TABLET(S): 25; 100 TABLET ORAL at 13:56

## 2018-03-22 NOTE — PROGRESS NOTE ADULT - PROBLEM SELECTOR PLAN 2
Patient with abdominal rash with no signs of mucosal spread. Groin rash likely from dermatitis.   -nystatin powder
Patient with abdominal rash with no signs of mucosal spread. Groin rash likely from dermatitis.   -nystatin powder  -will monitor for worsening symptoms

## 2018-03-22 NOTE — PROGRESS NOTE ADULT - ATTENDING COMMENTS
Patient seen and examined. Agree with above note by resident.    # non purulent cellulitis - C/w clinda for now. Clinically improving. Transition to PO.   # radiculopathy - PT eval recommends rehab. Pt declined rehab. Want to go home.   #COPD - not in exacerbation. C/w spiriva, symbicort, o2 as needed  #BRIAN - patient not compliant with cpap at home. Stressed importance of cpap use. patient to get repeat sleep study as outpt per pulmonologists recommendations.   #paroxysmal afib - c/w xarelto   #parkinsons disease -  c/w sinemet  DM2 with hyerglycemia - pt is noncompliant with recommended low carb diet and insulin. Stressed importance of better FS control and outpt follow up.    Plan discussed with patient and wife at bedside. Patient seen and examined. Agree with above note by resident.    # non purulent cellulitis - C/w clinda for now. Clinically improving. Transition to PO.   # radiculopathy - PT eval recommends rehab. Pt declined rehab. Want to go home.   #COPD - not in exacerbation. C/w spiriva, symbicort, o2 as needed  #BRIAN - patient not compliant with cpap at home. Stressed importance of cpap use. patient to get repeat sleep study as outpt per pulmonologists recommendations.   #paroxysmal afib - c/w xarelto   #parkinsons disease -  c/w sinemet  DM2 with hyerglycemia - pt is noncompliant with recommended low carb diet and insulin. Stressed importance of better FS control and outpt follow up.    Plan discussed with patient and wife at bedside. DC home today. Time spent>30 mins

## 2018-03-22 NOTE — PROGRESS NOTE ADULT - SUBJECTIVE AND OBJECTIVE BOX
Jonathan Gilbert  PGY 1  Care Model B  Pager [phone]    Patient is a 62y old  Male who presents with a chief complaint of cough and cellulitis of legs (21 Mar 2018 13:58)      SUBJECTIVE / OVERNIGHT EVENTS:    MEDICATIONS  (STANDING):  buDESOnide 160 MICROgram(s)/formoterol 4.5 MICROgram(s) Inhaler 2 Puff(s) Inhalation two times a day  busPIRone 15 milliGRAM(s) Oral two times a day  carbidopa/levodopa  25/100 1 Tablet(s) Oral three times a day  clindamycin IVPB 900 milliGRAM(s) IV Intermittent every 8 hours  clonazePAM Tablet 0.5 milliGRAM(s) Oral daily  dextrose 5%. 1000 milliLiter(s) (50 mL/Hr) IV Continuous <Continuous>  dextrose 50% Injectable 12.5 Gram(s) IV Push once  dextrose 50% Injectable 25 Gram(s) IV Push once  dextrose 50% Injectable 25 Gram(s) IV Push once  diVALproex  milliGRAM(s) Oral at bedtime  famotidine    Tablet 20 milliGRAM(s) Oral at bedtime  fluticasone propionate 50 MICROgram(s)/spray Nasal Spray 1 Spray(s) Both Nostrils two times a day  folic acid 0.8 milliGRAM(s) Oral daily  gabapentin 400 milliGRAM(s) Oral three times a day  guaiFENesin   Syrup  (Sugar-Free) 200 milliGRAM(s) Oral every 6 hours  insulin glargine Injectable (LANTUS) 38 Unit(s) SubCutaneous two times a day  insulin lispro (HumaLOG) corrective regimen sliding scale   SubCutaneous three times a day before meals  insulin lispro (HumaLOG) corrective regimen sliding scale   SubCutaneous at bedtime  insulin lispro Injectable (HumaLOG) 10 Unit(s) SubCutaneous three times a day before meals  lidocaine   Patch 1 Patch Transdermal daily  metoprolol     tartrate 25 milliGRAM(s) Oral two times a day  nystatin Powder 1 Application(s) Topical two times a day  rivaroxaban 20 milliGRAM(s) Oral every 24 hours  simvastatin 20 milliGRAM(s) Oral at bedtime  tiotropium 18 MICROgram(s) Capsule 1 Capsule(s) Inhalation daily    MEDICATIONS  (PRN):  acetaminophen   Tablet. 650 milliGRAM(s) Oral every 6 hours PRN Moderate Pain (4 - 6)  dextrose Gel 1 Dose(s) Oral once PRN Blood Glucose LESS THAN 70 milliGRAM(s)/deciliter  glucagon  Injectable 1 milliGRAM(s) IntraMuscular once PRN Glucose LESS THAN 70 milligrams/deciliter  traMADol 25 milliGRAM(s) Oral every 6 hours PRN Severe Pain (7 - 10)      Vital Signs Last 24 Hrs  T(C): 36.9 (22 Mar 2018 05:42), Max: 36.9 (21 Mar 2018 21:36)  T(F): 98.4 (22 Mar 2018 05:42), Max: 98.5 (21 Mar 2018 21:36)  HR: 77 (22 Mar 2018 05:42) (69 - 90)  BP: 118/59 (22 Mar 2018 05:42) (109/59 - 138/57)  BP(mean): --  RR: 17 (22 Mar 2018 05:42) (17 - 18)  SpO2: 95% (22 Mar 2018 05:42) (95% - 96%)  CAPILLARY BLOOD GLUCOSE      POCT Blood Glucose.: 233 mg/dL (21 Mar 2018 21:57)  POCT Blood Glucose.: 215 mg/dL (21 Mar 2018 17:48)  POCT Blood Glucose.: 239 mg/dL (21 Mar 2018 12:34)  POCT Blood Glucose.: 214 mg/dL (21 Mar 2018 08:44)    I&O's Summary      PHYSICAL EXAM:  GENERAL: NAD, well-developed  HEAD:  Atraumatic, Normocephalic  EYES: EOMI, PERRLA, conjunctiva and sclera clear  NECK: Supple, No JVD  CHEST/LUNG: Clear to auscultation bilaterally; No wheeze  HEART: Regular rate and rhythm; No murmurs, rubs, or gallops  ABDOMEN: Soft, Nontender, Nondistended; Bowel sounds present  EXTREMITIES:  2+ Peripheral Pulses, No clubbing, cyanosis, or edema  PSYCH: AAOx3  NEUROLOGY: non-focal  SKIN: No rashes or lesions    LABS:                        12.6   4.58  )-----------( 237      ( 21 Mar 2018 06:20 )             40.3     03-21    136  |  96<L>  |  15  ----------------------------<  283<H>  4.7   |  30  |  1.22    Ca    7.9<L>      21 Mar 2018 06:20  Phos  4.0     03-21  Mg     2.1     03-21                RADIOLOGY & ADDITIONAL TESTS:    Imaging Personally Reviewed:    Consultant(s) Notes Reviewed:      Care Discussed with Consultants/Other Providers: Jonathan Gilbert  PGY 1  Care Model B  Pager [phone]    Patient is a 62y old  Male who presents with a chief complaint of cough and cellulitis of legs (21 Mar 2018 13:58)      SUBJECTIVE / OVERNIGHT EVENTS:  Patient states that he feels better. He feel that he will have a better recovery at home and does not want to go to rehab, as it has not helped him in the past.   He states that his cough has improved as well as his LE pain, erythema and edema.   He did not wear cpap overnight due to poor fitting mask. States he has a sleep study next week.  He denies fever, chills, chest pain, palpitations, abdominal pain, N/V, diarrhea, dysuria.     MEDICATIONS  (STANDING):  buDESOnide 160 MICROgram(s)/formoterol 4.5 MICROgram(s) Inhaler 2 Puff(s) Inhalation two times a day  busPIRone 15 milliGRAM(s) Oral two times a day  carbidopa/levodopa  25/100 1 Tablet(s) Oral three times a day  clindamycin IVPB 900 milliGRAM(s) IV Intermittent every 8 hours  clonazePAM Tablet 0.5 milliGRAM(s) Oral daily  dextrose 5%. 1000 milliLiter(s) (50 mL/Hr) IV Continuous <Continuous>  dextrose 50% Injectable 12.5 Gram(s) IV Push once  dextrose 50% Injectable 25 Gram(s) IV Push once  dextrose 50% Injectable 25 Gram(s) IV Push once  diVALproex  milliGRAM(s) Oral at bedtime  famotidine    Tablet 20 milliGRAM(s) Oral at bedtime  fluticasone propionate 50 MICROgram(s)/spray Nasal Spray 1 Spray(s) Both Nostrils two times a day  folic acid 0.8 milliGRAM(s) Oral daily  gabapentin 400 milliGRAM(s) Oral three times a day  guaiFENesin   Syrup  (Sugar-Free) 200 milliGRAM(s) Oral every 6 hours  insulin glargine Injectable (LANTUS) 38 Unit(s) SubCutaneous two times a day  insulin lispro (HumaLOG) corrective regimen sliding scale   SubCutaneous three times a day before meals  insulin lispro (HumaLOG) corrective regimen sliding scale   SubCutaneous at bedtime  insulin lispro Injectable (HumaLOG) 10 Unit(s) SubCutaneous three times a day before meals  lidocaine   Patch 1 Patch Transdermal daily  metoprolol     tartrate 25 milliGRAM(s) Oral two times a day  nystatin Powder 1 Application(s) Topical two times a day  rivaroxaban 20 milliGRAM(s) Oral every 24 hours  simvastatin 20 milliGRAM(s) Oral at bedtime  tiotropium 18 MICROgram(s) Capsule 1 Capsule(s) Inhalation daily    MEDICATIONS  (PRN):  acetaminophen   Tablet. 650 milliGRAM(s) Oral every 6 hours PRN Moderate Pain (4 - 6)  dextrose Gel 1 Dose(s) Oral once PRN Blood Glucose LESS THAN 70 milliGRAM(s)/deciliter  glucagon  Injectable 1 milliGRAM(s) IntraMuscular once PRN Glucose LESS THAN 70 milligrams/deciliter  traMADol 25 milliGRAM(s) Oral every 6 hours PRN Severe Pain (7 - 10)      Vital Signs Last 24 Hrs  T(C): 36.9 (22 Mar 2018 05:42), Max: 36.9 (21 Mar 2018 21:36)  T(F): 98.4 (22 Mar 2018 05:42), Max: 98.5 (21 Mar 2018 21:36)  HR: 77 (22 Mar 2018 05:42) (69 - 90)  BP: 118/59 (22 Mar 2018 05:42) (109/59 - 138/57)  BP(mean): --  RR: 17 (22 Mar 2018 05:42) (17 - 18)  SpO2: 95% (22 Mar 2018 05:42) (95% - 96%)  CAPILLARY BLOOD GLUCOSE      POCT Blood Glucose.: 233 mg/dL (21 Mar 2018 21:57)  POCT Blood Glucose.: 215 mg/dL (21 Mar 2018 17:48)  POCT Blood Glucose.: 239 mg/dL (21 Mar 2018 12:34)  POCT Blood Glucose.: 214 mg/dL (21 Mar 2018 08:44)    I&O's Summary      PHYSICAL EXAM:  GENERAL: NAD, morbidly obese  	HEAD:  poor dentition, Atraumatic, Normocephalic  	EYES: EOMI, PERRLA, conjunctiva and sclera clear  	NECK: Supple, No JVD, large neck circumference  	CHEST/LUNG: CTAB; 95% on 4L;   	HEART: Distant heart sounds. Irregularly irregular; regular rhythm; No murmurs, rubs, or gallops  	ABDOMEN: Obese abdomen; Soft, Nontender, Nondistended; Bowel sounds present  	EXTREMITIES: Erythema of legs bilaterally with improved TTP extending just inferior to the knees; no hair on legs; ulceration on left lateral foot without purulent drainage; decreased Peripheral Pulses; decreased sensation of feet bilaterally No clubbing, cyanosis, crepitus or edema  	PSYCH: AAOx3  	NEUROLOGY: non-focal  SKIN: erythema of abdomen without TTP with skin breakdown    LABS:                        12.6   4.58  )-----------( 237      ( 21 Mar 2018 06:20 )             40.3     03-21    136  |  96<L>  |  15  ----------------------------<  283<H>  4.7   |  30  |  1.22    Ca    7.9<L>      21 Mar 2018 06:20  Phos  4.0     03-21  Mg     2.1     03-21

## 2018-03-22 NOTE — PROGRESS NOTE ADULT - PROBLEM SELECTOR PLAN 4
Patient with elevated BG on high levels of insulin at home with likely peripheral neuropathy.  -continue gabapentin 400 TID   -wound care of foot  -increased glargine 38U lispro 12U TID home doses + SSI will adjust as needed
Patient with elevated BG on high levels of insulin at home with likely peripheral neuropathy.  -continue gabapentin, will increase to 600 TID given continued pain  -wound care of foot  -increased glargine 35U lispro 10U TID home doses + SSI will adjust as needed

## 2018-03-22 NOTE — PROGRESS NOTE ADULT - ASSESSMENT
62M PMHx TIIDM, Afib on rivaroxaban s/p ablation, morbid obesity, Parkinson's disease, COPD on 4LNC, asthma, BRIAN on Bilevel uses inconsistently, HTN, DM,n presenting with cough via EMS and subsequently found to have possible cellulitis. Patient without leukocytosis with mild fever in ED, and pain. Patient with signs of venous stasis and a diabetic foot ulcer. Unlikely DVT given bilateral erythema and pt on rivaroxaban. Improving on IV antibiotics.
62M PMHx TIIDM, Afib on rivaroxaban s/p ablation, morbid obesity, Parkinson's disease, COPD on 4LNC, asthma, BRIAN on Bilevel uses inconsistently, HTN, DM,n presenting with cough via EMS and subsequently found to have possible cellulitis. Patient without leukocytosis with mild fever in ED, and pain. Patient with signs of venous stasis and a diabetic foot ulcer. Unlikely DVT given bilateral erythema and pt on rivaroxaban. Improving on IV antibiotics.

## 2018-03-22 NOTE — PROGRESS NOTE ADULT - PROBLEM SELECTOR PROBLEM 4
Type 2 diabetes mellitus with foot ulcer, with long-term current use of insulin
Type 2 diabetes mellitus with foot ulcer, with long-term current use of insulin

## 2018-03-22 NOTE — PROGRESS NOTE ADULT - PROBLEM SELECTOR PLAN 6
CHADSVASC of 2-3 (HTN, DM, ?PAD) on rivaroxaban, no signs of RVR.   -continue rivaroxaban
CHADSVASC of 2-3 (HTN, DM, ?PAD) on rivaroxaban, no signs of RVR.   -continue rivaroxaban

## 2018-03-22 NOTE — PROGRESS NOTE ADULT - PROBLEM SELECTOR PLAN 1
Patient without leukocytosis with mild fever in ED, and pain. Patient with signs of venous stasis and a diabetic foot ulcer. Unlikely DVT given bilateral erythema and pt on rivaroxaban. Was previously on doxycycline without improvement. Currently improving on IV antibiotics. Blood cultures negative to day  -will continue clindamycin 900 mg q8 day 3 (day 4 with doxycycline) will switch to oral clindamycin 450mg QID for a total of 7 days Nonpurulent. Patient without leukocytosis with mild fever in ED, and pain. Patient with signs of venous stasis and a diabetic foot ulcer. Unlikely DVT given bilateral erythema and pt on rivaroxaban. Was previously on doxycycline without improvement. Currently improving on IV antibiotics. Blood cultures negative to day  -will continue clindamycin 900 mg q8 day 3 (day 4 with doxycycline) will switch to oral clindamycin 450mg QID for a total of 7 days

## 2018-03-22 NOTE — CHART NOTE - NSCHARTNOTEFT_GEN_A_CORE
Called by night nurse; pt refused CPAP as the only masks available are too small and uncomfortable; states that he needs a "custom made mask."

## 2018-03-22 NOTE — PROGRESS NOTE ADULT - PROBLEM SELECTOR PLAN 3
Patient presenting with cough. No change in sputum production or worsening SOB. On 4 L NC at home. No leukocytosis. CXR no opacifications. Patient may have cardiac complications of BRIAN+COPD possibly pulm htn, however no LE edema.  -Continue on O2 at home as needed  -continue home inhaler tiotropium and budesonide-formoterol; fluticasone; antitussives as needed.
Patient presenting with cough. No change in sputum production or worsening SOB. On 4 L NC at home. No leukocytosis. CXR no opacifications. Patient may have cardiac complications of BRIAN+COPD possibly pulm htn, however no LE edema.  -Continue on O2 at home as needed  -May need OP TTE for assessment of cardiac function  -continue home inhaler tiotropium and budesonide-formoterol  antitussives as needed.

## 2018-03-22 NOTE — PROGRESS NOTE ADULT - PROBLEM SELECTOR PLAN 8
Patient with history of anxiety and depression on buspiron and clonazepam.  -continue divalproex  -continue buspirone  -will continue clonazepam ISTOP # [id number]
Patient with history of anxiety and depression on buspiron and clonazepam.  -unclear use of divalproex, PCP unclear as Sunday was first visit  -continue buspirone  -will continue clonazepam ISTOP # [id number]

## 2018-03-22 NOTE — PROGRESS NOTE ADULT - PROBLEM SELECTOR PLAN 5
Patient with dx of parkinson on carbi-levodopa  -continue home medications
Patient with dx of parkinson on carbi-levodopa  -continue home medications

## 2018-03-22 NOTE — PROGRESS NOTE ADULT - PROBLEM SELECTOR PLAN 7
Patient states he does not use CPAP at home due to discomfort. Does not known settings.   -Refusing inpatient CPAP, states he will follow up with OP sleep study.
Patient states he does not use CPAP at home due to discomfort. Does not known settings.   -Patient does not want to start CPAP currently, will likely need OP sleep study.

## 2018-03-24 LAB
BACTERIA BLD CULT: SIGNIFICANT CHANGE UP
BACTERIA BLD CULT: SIGNIFICANT CHANGE UP

## 2022-01-27 NOTE — ED ADULT NURSE NOTE - PAIN RATING/NUMBER SCALE (0-10): REST
4 Terbinafine Counseling: Patient counseling regarding adverse effects of terbinafine including but not limited to headache, diarrhea, rash, upset stomach, liver function test abnormalities, itching, taste/smell disturbance, nausea, abdominal pain, and flatulence.  There is a rare possibility of liver failure that can occur when taking terbinafine.  The patient understands that a baseline LFT and kidney function test may be required. The patient verbalized understanding of the proper use and possible adverse effects of terbinafine.  All of the patient's questions and concerns were addressed.

## 2022-09-06 NOTE — H&P ADULT - NSHPOUTPATIENTPROVIDERS_GEN_ALL_CORE
Problem: VTE, Risk for  Goal: # No s/s of VTE  Outcome: Outcome Met, Continue evaluating goal progress toward completion  Goal: # Verbalizes understanding of VTE risk factors and prevention  Description: Document education using the patient education activity.   Outcome: Outcome Met, Continue evaluating goal progress toward completion  Goal: Demonstrates ability to administer injectable anticoagulants if ordered for d/c  Description: Document education using the patient education activity.  Outcome: Outcome Not Met, Continue to Monitor      Zoya Rodríguez Zoya Pierre, pulmonologist (does not know first name)

## 2023-06-23 NOTE — ED PROVIDER NOTE - CROS ED SKIN NEG
No Change
No Change
Improving
No Change
no laceration
No Change
No Change
Improving

## 2024-01-28 NOTE — PROGRESS NOTE ADULT - PROBLEM SELECTOR PROBLEM 1
28-Jan-2024 18:13
Cellulitis of lower extremity, unspecified laterality
Cellulitis of lower extremity, unspecified laterality

## 2024-09-26 NOTE — PATIENT PROFILE ADULT. - NS TRANSFER DISPOSITION PATIENT BELONGINGS
Ambulatory Visit  Name: Rikki Arguello      : 1962      MRN: [id number]  Encounter Provider: KALPANA Yañez  Encounter Date: 2024   Encounter department: [facility]      Boost to J and B   Assessment & Plan  Type 2 diabetes mellitus without complication, without long-term current use of insulin (HCC)    Lab Results   Component Value Date    HGBA1C 5.3 2024     Well controlled, diet controlled     Orders:    POCT hemoglobin A1c    Elevated prostate specific antigen (PSA)  Lab Results   Component Value Date    PSA 4.350 (H) 2024    PSA 4.5 (H) 2023    PSA 3.2 03/10/2022       Orders:    PSA, total and free; Future    Iron deficiency anemia, unspecified iron deficiency anemia type  Lab Results   Component Value Date    WBC 4.49 2024    HGB 9.4 (L) 2024    HCT 34.0 (L) 2024    MCV 73 (L) 2024     (H) 2024     Lab Results   Component Value Date    IRON 29 (L) 2024    TIBC 387 2024    FERRITIN 10 (L) 2024     Recommend repeat PSA, he is due for colonoscopy   Orders:    Iron Panel (Includes Ferritin, Iron Sat%, Iron, and TIBC); Future    CBC and differential; Future    Flu vaccine need    Orders:    influenza vaccine, recombinant, PF, 0.5 mL IM (Flublok)    Benign essential hypertension  BP within goal, not on any antihypertensive medications          Paraplegic spinal paralysis (HCC)  Wheelchair bound          Continuous opioid dependence (HCC)  Currently prescribed oxycodone 15 mg Q 6 hours (90 MME/ day), patient is requesting to increase back to previous dose ( 20 mg q 4 hours) reviewed why this is not recommended or indicated   Discussed alternative treatment plans   Continue with oxycodone at current dose, next appointment plan to decrease further ~10%            History of Present Illness     Patient is a 63 YO male who  has a past medical history of Anemia, Blind, Chronic cystitis,  Colostomy in place ([facility]), Detrusor sphincter dyssynergia, Diabetes mellitus (HCC), Erectile dysfunction, Frequency of urination, GERD (gastroesophageal reflux disease), History of diabetes mellitus, History of osteomyelitis, leg amputation ([facility]), Hyperlipidemia, Hypertension, Hypospadias, Incomplete bladder emptying, 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    Patient reports no change in condition. Continues to follow with wound care for pressure ulcers to the sacral area and right hip. VNA is assisting with dressing changes. He has no new concerns or issues.     The following portions of the patient's history were reviewed and updated as appropriate: allergies, current medications, past family history, past medical history, past social history, past surgical history and problem list.            Review of Systems   Constitutional:  Negative for chills and fever.   HENT:  Negative for ear pain and sore throat.    Eyes:  Negative for pain and visual disturbance.   Respiratory:  Negative for cough and shortness of breath.    Cardiovascular:  Negative for chest pain and palpitations.   Gastrointestinal:  Negative for abdominal pain and vomiting.   Genitourinary:  Negative for dysuria and hematuria.   Musculoskeletal:  Positive for arthralgias, back pain, gait problem, myalgias and neck pain.   Skin:  Negative for color change and rash.   Neurological:  Negative for seizures and syncope.   All other systems reviewed and are negative.          Objective     /72 (BP Location: Left arm, Patient Position: Sitting, Cuff Size: Standard)   Pulse 76   Temp 98.4 °F (36.9 °C) (Temporal)   Resp 18   SpO2 98%     Physical Exam  Constitutional:       General: He is not in acute distress.     Appearance: He is not toxic-appearing.   Eyes:      Comments: Right eye  clouded / blind    Cardiovascular:      Rate and Rhythm: Normal rate and regular rhythm.   Pulmonary:      Effort: Pulmonary effort is normal. No respiratory distress.      Breath sounds: No wheezing.   Neurological:      Mental Status: He is alert.      Sensory: Sensory deficit present.      Motor: Weakness present.      Gait: Gait abnormal.      Comments: Wheelchair   Right arm contracture           with patient